# Patient Record
Sex: MALE
[De-identification: names, ages, dates, MRNs, and addresses within clinical notes are randomized per-mention and may not be internally consistent; named-entity substitution may affect disease eponyms.]

---

## 2021-09-28 PROBLEM — Z00.00 ENCOUNTER FOR PREVENTIVE HEALTH EXAMINATION: Status: ACTIVE | Noted: 2021-09-28

## 2021-09-29 ENCOUNTER — APPOINTMENT (OUTPATIENT)
Dept: PULMONOLOGY | Facility: CLINIC | Age: 81
End: 2021-09-29
Payer: MEDICARE

## 2021-09-29 VITALS — WEIGHT: 312 LBS | HEIGHT: 75 IN | HEART RATE: 64 BPM | BODY MASS INDEX: 38.79 KG/M2 | OXYGEN SATURATION: 90 %

## 2021-09-29 DIAGNOSIS — J44.9 CHRONIC OBSTRUCTIVE PULMONARY DISEASE, UNSPECIFIED: ICD-10-CM

## 2021-09-29 DIAGNOSIS — J96.11 CHRONIC RESPIRATORY FAILURE WITH HYPOXIA: ICD-10-CM

## 2021-09-29 DIAGNOSIS — Z86.19 PERSONAL HISTORY OF OTHER INFECTIOUS AND PARASITIC DISEASES: ICD-10-CM

## 2021-09-29 DIAGNOSIS — F17.200 NICOTINE DEPENDENCE, UNSPECIFIED, UNCOMPLICATED: ICD-10-CM

## 2021-09-29 DIAGNOSIS — R06.00 DYSPNEA, UNSPECIFIED: ICD-10-CM

## 2021-09-29 DIAGNOSIS — E66.9 OBESITY, UNSPECIFIED: ICD-10-CM

## 2021-09-29 DIAGNOSIS — Z87.39 PERSONAL HISTORY OF OTHER DISEASES OF THE MUSCULOSKELETAL SYSTEM AND CONNECTIVE TISSUE: ICD-10-CM

## 2021-09-29 DIAGNOSIS — I27.81 COR PULMONALE (CHRONIC): ICD-10-CM

## 2021-09-29 DIAGNOSIS — Z86.39 PERSONAL HISTORY OF OTHER ENDOCRINE, NUTRITIONAL AND METABOLIC DISEASE: ICD-10-CM

## 2021-09-29 DIAGNOSIS — Z86.79 PERSONAL HISTORY OF OTHER DISEASES OF THE CIRCULATORY SYSTEM: ICD-10-CM

## 2021-09-29 DIAGNOSIS — Z86.16 PERSONAL HISTORY OF COVID-19: ICD-10-CM

## 2021-09-29 PROCEDURE — 99204 OFFICE O/P NEW MOD 45 MIN: CPT

## 2021-09-29 RX ORDER — FLUTICASONE FUROATE, UMECLIDINIUM BROMIDE AND VILANTEROL TRIFENATATE 100; 62.5; 25 UG/1; UG/1; UG/1
100-62.5-25 POWDER RESPIRATORY (INHALATION) DAILY
Qty: 1 | Refills: 3 | Status: ACTIVE | COMMUNITY
Start: 2021-09-29 | End: 1900-01-01

## 2021-09-29 NOTE — CONSULT LETTER
[Dear  ___] : Dear  [unfilled], [Consult Letter:] : I had the pleasure of evaluating your patient, [unfilled]. [Please see my note below.] : Please see my note below. [Consult Closing:] : Thank you very much for allowing me to participate in the care of this patient.  If you have any questions, please do not hesitate to contact me. [Sincerely,] : Sincerely, [DrPartha  ___] : Dr. FERNÁNDEZ

## 2021-09-29 NOTE — HISTORY OF PRESENT ILLNESS
[TextBox_4] : 100 pack year smoker \par [facility]\par Recently using Trelegy and 02 at 1-4 LPM depending on level of exertion\par Changed Doctors since Can't be seen on Mondays or Fridays - in [place] - working - [employer]\par 02 dependent since had COVID in April of 2020\par Has had recent PFT and CXR via [facility] - will obtain records \par Says Dr Shearer prescribed Furosemide and Dr Deleon follows labs

## 2021-09-29 NOTE — PHYSICAL EXAM
[No Acute Distress] : no acute distress [Elongated Uvula] : elongated uvula [Enlarged Base of the Tongue] : enlarged base of the tongue [II] : Mallampati Class: II [Normal Appearance] : normal appearance [Neck Circumference: ___] : neck circumference: [unfilled] [No Neck Mass] : no neck mass [Normal Rate/Rhythm] : normal rate/rhythm [No Resp Distress] : no resp distress [Clear to Auscultation Bilaterally] : clear to auscultation bilaterally [No Clubbing] : no clubbing [No Cyanosis] : no cyanosis [No Edema] : no edema [No Focal Deficits] : no focal deficits [Oriented x3] : oriented x3 [Normal Affect] : normal affect [TextBox_2] : obese [TextBox_68] : Diminished

## 2021-09-30 ENCOUNTER — RX CHANGE (OUTPATIENT)
Age: 81
End: 2021-09-30

## 2021-09-30 RX ORDER — FUROSEMIDE 40 MG/1
40 TABLET ORAL
Qty: 90 | Refills: 1 | Status: ACTIVE | COMMUNITY
Start: 2021-09-29 | End: 1900-01-01

## 2022-03-06 ENCOUNTER — INPATIENT (INPATIENT)
Facility: HOSPITAL | Age: 82
LOS: 12 days | Discharge: HOSPICE | DRG: 291 | End: 2022-03-19
Attending: INTERNAL MEDICINE | Admitting: FAMILY MEDICINE
Payer: MEDICARE

## 2022-03-06 VITALS
HEART RATE: 52 BPM | TEMPERATURE: 98 F | DIASTOLIC BLOOD PRESSURE: 74 MMHG | OXYGEN SATURATION: 88 % | SYSTOLIC BLOOD PRESSURE: 147 MMHG | RESPIRATION RATE: 24 BRPM

## 2022-03-06 DIAGNOSIS — Z98.890 OTHER SPECIFIED POSTPROCEDURAL STATES: Chronic | ICD-10-CM

## 2022-03-06 DIAGNOSIS — I50.23 ACUTE ON CHRONIC SYSTOLIC (CONGESTIVE) HEART FAILURE: Chronic | ICD-10-CM

## 2022-03-06 DIAGNOSIS — I50.9 HEART FAILURE, UNSPECIFIED: ICD-10-CM

## 2022-03-06 LAB
ALBUMIN SERPL ELPH-MCNC: 3.5 G/DL — SIGNIFICANT CHANGE UP (ref 3.3–5.2)
ALP SERPL-CCNC: 80 U/L — SIGNIFICANT CHANGE UP (ref 40–120)
ALT FLD-CCNC: 11 U/L — SIGNIFICANT CHANGE UP
ANION GAP SERPL CALC-SCNC: 8 MMOL/L — SIGNIFICANT CHANGE UP (ref 5–17)
APTT BLD: 32.1 SEC — SIGNIFICANT CHANGE UP (ref 27.5–35.5)
AST SERPL-CCNC: 15 U/L — SIGNIFICANT CHANGE UP
BASO STIPL BLD QL SMEAR: PRESENT — SIGNIFICANT CHANGE UP
BASOPHILS # BLD AUTO: 0.12 K/UL — SIGNIFICANT CHANGE UP (ref 0–0.2)
BASOPHILS NFR BLD AUTO: 2.5 % — HIGH (ref 0–2)
BILIRUB SERPL-MCNC: 1.1 MG/DL — SIGNIFICANT CHANGE UP (ref 0.4–2)
BUN SERPL-MCNC: 34.4 MG/DL — HIGH (ref 8–20)
CALCIUM SERPL-MCNC: 8.8 MG/DL — SIGNIFICANT CHANGE UP (ref 8.6–10.2)
CHLORIDE SERPL-SCNC: 99 MMOL/L — SIGNIFICANT CHANGE UP (ref 98–107)
CK SERPL-CCNC: 37 U/L — SIGNIFICANT CHANGE UP (ref 30–200)
CO2 SERPL-SCNC: 34 MMOL/L — HIGH (ref 22–29)
CREAT SERPL-MCNC: 1.57 MG/DL — HIGH (ref 0.5–1.3)
EGFR: 45 ML/MIN/1.73M2 — LOW
EOSINOPHIL # BLD AUTO: 0.16 K/UL — SIGNIFICANT CHANGE UP (ref 0–0.5)
EOSINOPHIL NFR BLD AUTO: 3.4 % — SIGNIFICANT CHANGE UP (ref 0–6)
FLUAV AG NPH QL: SIGNIFICANT CHANGE UP
FLUBV AG NPH QL: SIGNIFICANT CHANGE UP
GIANT PLATELETS BLD QL SMEAR: PRESENT — SIGNIFICANT CHANGE UP
GLUCOSE BLDC GLUCOMTR-MCNC: 143 MG/DL — HIGH (ref 70–99)
GLUCOSE SERPL-MCNC: 113 MG/DL — HIGH (ref 70–99)
HCT VFR BLD CALC: 38.1 % — LOW (ref 39–50)
HGB BLD-MCNC: 11.2 G/DL — LOW (ref 13–17)
INR BLD: 1.2 RATIO — HIGH (ref 0.88–1.16)
LACTATE BLDV-MCNC: 1.6 MMOL/L — SIGNIFICANT CHANGE UP (ref 0.5–2)
LYMPHOCYTES # BLD AUTO: 0.4 K/UL — LOW (ref 1–3.3)
LYMPHOCYTES # BLD AUTO: 8.5 % — LOW (ref 13–44)
MACROCYTES BLD QL: SLIGHT — SIGNIFICANT CHANGE UP
MANUAL SMEAR VERIFICATION: SIGNIFICANT CHANGE UP
MCHC RBC-ENTMCNC: 29.4 GM/DL — LOW (ref 32–36)
MCHC RBC-ENTMCNC: 30.2 PG — SIGNIFICANT CHANGE UP (ref 27–34)
MCV RBC AUTO: 102.7 FL — HIGH (ref 80–100)
MONOCYTES # BLD AUTO: 0.6 K/UL — SIGNIFICANT CHANGE UP (ref 0–0.9)
MONOCYTES NFR BLD AUTO: 12.7 % — SIGNIFICANT CHANGE UP (ref 2–14)
NEUTROPHILS # BLD AUTO: 3.38 K/UL — SIGNIFICANT CHANGE UP (ref 1.8–7.4)
NEUTROPHILS NFR BLD AUTO: 72 % — SIGNIFICANT CHANGE UP (ref 43–77)
PLAT MORPH BLD: NORMAL — SIGNIFICANT CHANGE UP
PLATELET # BLD AUTO: 188 K/UL — SIGNIFICANT CHANGE UP (ref 150–400)
POIKILOCYTOSIS BLD QL AUTO: SLIGHT — SIGNIFICANT CHANGE UP
POLYCHROMASIA BLD QL SMEAR: SLIGHT — SIGNIFICANT CHANGE UP
POTASSIUM SERPL-MCNC: 4.8 MMOL/L — SIGNIFICANT CHANGE UP (ref 3.5–5.3)
POTASSIUM SERPL-SCNC: 4.8 MMOL/L — SIGNIFICANT CHANGE UP (ref 3.5–5.3)
PROT SERPL-MCNC: 6.7 G/DL — SIGNIFICANT CHANGE UP (ref 6.6–8.7)
PROTHROM AB SERPL-ACNC: 13.9 SEC — HIGH (ref 10.5–13.4)
RBC # BLD: 3.71 M/UL — LOW (ref 4.2–5.8)
RBC # FLD: 17.2 % — HIGH (ref 10.3–14.5)
RBC BLD AUTO: NORMAL — SIGNIFICANT CHANGE UP
RSV RNA NPH QL NAA+NON-PROBE: SIGNIFICANT CHANGE UP
SARS-COV-2 RNA SPEC QL NAA+PROBE: SIGNIFICANT CHANGE UP
SODIUM SERPL-SCNC: 141 MMOL/L — SIGNIFICANT CHANGE UP (ref 135–145)
TROPONIN T SERPL-MCNC: 0.02 NG/ML — SIGNIFICANT CHANGE UP (ref 0–0.06)
TROPONIN T SERPL-MCNC: 0.03 NG/ML — SIGNIFICANT CHANGE UP (ref 0–0.06)
VARIANT LYMPHS # BLD: 0.9 % — SIGNIFICANT CHANGE UP (ref 0–6)
WBC # BLD: 4.69 K/UL — SIGNIFICANT CHANGE UP (ref 3.8–10.5)
WBC # FLD AUTO: 4.69 K/UL — SIGNIFICANT CHANGE UP (ref 3.8–10.5)

## 2022-03-06 PROCEDURE — 99284 EMERGENCY DEPT VISIT MOD MDM: CPT

## 2022-03-06 PROCEDURE — 76870 US EXAM SCROTUM: CPT | Mod: 26

## 2022-03-06 PROCEDURE — 99497 ADVNCD CARE PLAN 30 MIN: CPT | Mod: 25

## 2022-03-06 PROCEDURE — 93306 TTE W/DOPPLER COMPLETE: CPT | Mod: 26

## 2022-03-06 PROCEDURE — 74177 CT ABD & PELVIS W/CONTRAST: CPT | Mod: 26,ME

## 2022-03-06 PROCEDURE — 99223 1ST HOSP IP/OBS HIGH 75: CPT

## 2022-03-06 PROCEDURE — 71045 X-RAY EXAM CHEST 1 VIEW: CPT | Mod: 26

## 2022-03-06 PROCEDURE — G1004: CPT

## 2022-03-06 RX ORDER — IPRATROPIUM/ALBUTEROL SULFATE 18-103MCG
3 AEROSOL WITH ADAPTER (GRAM) INHALATION EVERY 6 HOURS
Refills: 0 | Status: DISCONTINUED | OUTPATIENT
Start: 2022-03-06 | End: 2022-03-13

## 2022-03-06 RX ORDER — ONDANSETRON 8 MG/1
4 TABLET, FILM COATED ORAL EVERY 6 HOURS
Refills: 0 | Status: DISCONTINUED | OUTPATIENT
Start: 2022-03-06 | End: 2022-03-13

## 2022-03-06 RX ORDER — CHOLECALCIFEROL (VITAMIN D3) 125 MCG
1000 CAPSULE ORAL DAILY
Refills: 0 | Status: DISCONTINUED | OUTPATIENT
Start: 2022-03-06 | End: 2022-03-13

## 2022-03-06 RX ORDER — FOLIC ACID 0.8 MG
1 TABLET ORAL DAILY
Refills: 0 | Status: DISCONTINUED | OUTPATIENT
Start: 2022-03-06 | End: 2022-03-13

## 2022-03-06 RX ORDER — DEXTROSE 50 % IN WATER 50 %
25 SYRINGE (ML) INTRAVENOUS ONCE
Refills: 0 | Status: DISCONTINUED | OUTPATIENT
Start: 2022-03-06 | End: 2022-03-13

## 2022-03-06 RX ORDER — ACETAMINOPHEN 500 MG
650 TABLET ORAL EVERY 6 HOURS
Refills: 0 | Status: DISCONTINUED | OUTPATIENT
Start: 2022-03-06 | End: 2022-03-13

## 2022-03-06 RX ORDER — FUROSEMIDE 40 MG
40 TABLET ORAL EVERY 12 HOURS
Refills: 0 | Status: DISCONTINUED | OUTPATIENT
Start: 2022-03-06 | End: 2022-03-10

## 2022-03-06 RX ORDER — DEXTROSE 50 % IN WATER 50 %
15 SYRINGE (ML) INTRAVENOUS ONCE
Refills: 0 | Status: DISCONTINUED | OUTPATIENT
Start: 2022-03-06 | End: 2022-03-13

## 2022-03-06 RX ORDER — TAMSULOSIN HYDROCHLORIDE 0.4 MG/1
0.4 CAPSULE ORAL AT BEDTIME
Refills: 0 | Status: DISCONTINUED | OUTPATIENT
Start: 2022-03-06 | End: 2022-03-13

## 2022-03-06 RX ORDER — PREGABALIN 225 MG/1
1000 CAPSULE ORAL DAILY
Refills: 0 | Status: DISCONTINUED | OUTPATIENT
Start: 2022-03-06 | End: 2022-03-13

## 2022-03-06 RX ORDER — AMLODIPINE BESYLATE 2.5 MG/1
10 TABLET ORAL DAILY
Refills: 0 | Status: DISCONTINUED | OUTPATIENT
Start: 2022-03-06 | End: 2022-03-07

## 2022-03-06 RX ORDER — SODIUM CHLORIDE 9 MG/ML
1000 INJECTION, SOLUTION INTRAVENOUS
Refills: 0 | Status: DISCONTINUED | OUTPATIENT
Start: 2022-03-06 | End: 2022-03-13

## 2022-03-06 RX ORDER — TIOTROPIUM BROMIDE 18 UG/1
1 CAPSULE ORAL; RESPIRATORY (INHALATION) DAILY
Refills: 0 | Status: DISCONTINUED | OUTPATIENT
Start: 2022-03-06 | End: 2022-03-08

## 2022-03-06 RX ORDER — HEPARIN SODIUM 5000 [USP'U]/ML
5000 INJECTION INTRAVENOUS; SUBCUTANEOUS EVERY 8 HOURS
Refills: 0 | Status: DISCONTINUED | OUTPATIENT
Start: 2022-03-06 | End: 2022-03-09

## 2022-03-06 RX ORDER — OXYCODONE HYDROCHLORIDE 5 MG/1
5 TABLET ORAL EVERY 6 HOURS
Refills: 0 | Status: DISCONTINUED | OUTPATIENT
Start: 2022-03-06 | End: 2022-03-13

## 2022-03-06 RX ORDER — DEXTROSE 50 % IN WATER 50 %
12.5 SYRINGE (ML) INTRAVENOUS ONCE
Refills: 0 | Status: DISCONTINUED | OUTPATIENT
Start: 2022-03-06 | End: 2022-03-13

## 2022-03-06 RX ORDER — POLYETHYLENE GLYCOL 3350 17 G/17G
17 POWDER, FOR SOLUTION ORAL DAILY
Refills: 0 | Status: DISCONTINUED | OUTPATIENT
Start: 2022-03-06 | End: 2022-03-11

## 2022-03-06 RX ORDER — LEVOTHYROXINE SODIUM 125 MCG
300 TABLET ORAL DAILY
Refills: 0 | Status: DISCONTINUED | OUTPATIENT
Start: 2022-03-06 | End: 2022-03-13

## 2022-03-06 RX ORDER — METOPROLOL TARTRATE 50 MG
100 TABLET ORAL DAILY
Refills: 0 | Status: DISCONTINUED | OUTPATIENT
Start: 2022-03-06 | End: 2022-03-10

## 2022-03-06 RX ORDER — SENNA PLUS 8.6 MG/1
2 TABLET ORAL AT BEDTIME
Refills: 0 | Status: DISCONTINUED | OUTPATIENT
Start: 2022-03-06 | End: 2022-03-13

## 2022-03-06 RX ORDER — FUROSEMIDE 40 MG
40 TABLET ORAL ONCE
Refills: 0 | Status: COMPLETED | OUTPATIENT
Start: 2022-03-06 | End: 2022-03-06

## 2022-03-06 RX ORDER — GLUCAGON INJECTION, SOLUTION 0.5 MG/.1ML
1 INJECTION, SOLUTION SUBCUTANEOUS ONCE
Refills: 0 | Status: DISCONTINUED | OUTPATIENT
Start: 2022-03-06 | End: 2022-03-13

## 2022-03-06 RX ORDER — INSULIN LISPRO 100/ML
VIAL (ML) SUBCUTANEOUS AT BEDTIME
Refills: 0 | Status: DISCONTINUED | OUTPATIENT
Start: 2022-03-06 | End: 2022-03-13

## 2022-03-06 RX ORDER — BUDESONIDE AND FORMOTEROL FUMARATE DIHYDRATE 160; 4.5 UG/1; UG/1
2 AEROSOL RESPIRATORY (INHALATION)
Refills: 0 | Status: DISCONTINUED | OUTPATIENT
Start: 2022-03-06 | End: 2022-03-08

## 2022-03-06 RX ORDER — INSULIN LISPRO 100/ML
VIAL (ML) SUBCUTANEOUS
Refills: 0 | Status: DISCONTINUED | OUTPATIENT
Start: 2022-03-06 | End: 2022-03-13

## 2022-03-06 RX ADMIN — TAMSULOSIN HYDROCHLORIDE 0.4 MILLIGRAM(S): 0.4 CAPSULE ORAL at 22:44

## 2022-03-06 RX ADMIN — Medication 40 MILLIGRAM(S): at 22:43

## 2022-03-06 RX ADMIN — HEPARIN SODIUM 5000 UNIT(S): 5000 INJECTION INTRAVENOUS; SUBCUTANEOUS at 22:43

## 2022-03-06 RX ADMIN — BUDESONIDE AND FORMOTEROL FUMARATE DIHYDRATE 2 PUFF(S): 160; 4.5 AEROSOL RESPIRATORY (INHALATION) at 21:06

## 2022-03-06 RX ADMIN — Medication 40 MILLIGRAM(S): at 12:11

## 2022-03-06 NOTE — H&P ADULT - ASSESSMENT
INCOMPLETE 81 year old male with history of Lymphedema, Morbid Obesity, COPD on 4LNC, Smoking (stopped 3 years ago), Lung Nodules, HTN, Hypothyroidism and COVID-19 brought by daughter with hypoxia and scrotal swelling. As per patient, he has noticed scrotal swelling for 10 days and it is getting worse along with lymphedema. He is also complaining of exertional dyspnea. Denies orthopnea or paroxysmal nocturnal dyspnea. Denies fever, sick contacts or recent travel.   Patient denies history of CHF. Has not seen Cardiologist in 3 years. He takes Lasix for lymphedema however he is not compliant with it.   In ER, he was found to be hypoxic to 88%, supplemental oxygen was increased to 5LNC. He was noted to have anasarca and was given a dose of Lasix 40 mg x 1.     1) Anasarca   - Suspected underlying heart failure  - Strict intake/output and daily weight  - ECHO  - Lasix 40 mg IVP q 12 hours  - [facility] was consulted by ED    2) Right Pleural Effusion   - Lasix as above  - Consider thoracocentesis if no improvement     3) Scrotal Swelling  - Likely due to above  - Scrotal US  - Will consider Urology Consult if no improvement     4) Left Renal Lesion (suspected RCC)   - Discussed with patient, he denies any history in past   - Needs further work up by Urology     5) Renal Insufficiency  - Unknowns baseline renal function   - Avoid nephrotoxic medications  - Monitor renal function     6) BPH  - Shukla's catheter was placed in ER  - TOV in 3-4 days  - Start Flomax     7) COPD  - Symbicort and Spiriva (takes Trelegy at home)  - DuoNeb PRN    8) HTN  - Continue Metoprolol and Amlodipine    9) DM 2  - HbA1c   - Accu checks and ISS    10) Hypothyroidism  - Continue Synthroid     11) Elevated Lactate  - Likely due to hypoxia  - Repeat     DVT Prophylaxis -- Heparin SQ    Advance Directives: DNR/I.     Dispo: Likely home once stable.  81 year old male with history of Lymphedema, Morbid Obesity, COPD on 4LNC, Smoking (stopped 3 years ago), Lung Nodules, HTN, Hypothyroidism, RA and COVID-19 brought by daughter with hypoxia and scrotal swelling. As per patient, he has noticed scrotal swelling for 10 days and it is getting worse along with lymphedema. He is also complaining of exertional dyspnea. Denies orthopnea or paroxysmal nocturnal dyspnea. Denies fever, sick contacts or recent travel.   Patient denies history of CHF. Has not seen Cardiologist in 3 years. He takes Lasix for lymphedema however he is not compliant with it.   In ER, he was found to be hypoxic to 88%, supplemental oxygen was increased to 5LNC. He was noted to have anasarca and was given a dose of Lasix 40 mg x 1.     1) Anasarca   - Suspected underlying heart failure  - Strict intake/output and daily weight  - ECHO  - Lasix 40 mg IVP q 12 hours  - [facility] was consulted by ED    2) Right Pleural Effusion   - Lasix as above  - Consider thoracocentesis if no improvement     3) Scrotal Swelling  - Likely due to above  - Scrotal US  - Will consider Urology Consult if no improvement     4) Left Renal Lesion (suspected RCC)   - Discussed with patient, he denies any history in past   - Needs further work up by Urology     5) Renal Insufficiency  - Unknowns baseline renal function   - Avoid nephrotoxic medications  - Monitor renal function     6) BPH  - Shukla's catheter was placed in ER  - TOV in 3-4 days  - Start Flomax     7) COPD  - Symbicort and Spiriva (takes Trelegy at home)  - DuoNeb PRN    8) HTN  - Continue Metoprolol and Amlodipine    9) DM 2  - HbA1c   - Accu checks and ISS    10) Hypothyroidism  - Continue Synthroid     11) Rheumatoid Arthritis  - Gets weekly Methotrexate (on Friday)  - Will avoid NSAIDs due to renal insufficiency  - Oxycodone PRN    12) Elevated Lactate  - Likely due to hypoxia  - Repeat     DVT Prophylaxis -- Heparin SQ    Advance Directives: DNR/I.     Dispo: Likely home once stable.

## 2022-03-06 NOTE — ED PROVIDER NOTE - OBJECTIVE STATEMENT
Patient is a 80yo M presenting with chief complaint of his balls being too large. He states that he is on 5L home O2 continuously. He states that he has intermittently been taking his water pill. Patient states that he follows with a pulmonologist. Patient notes a history of COPD and COVID in 2020, has been on O2 since. Denies cardiac disease, Denies fevers, chills, chest pain, nausea, vomiting, diarrhea Patient is a 80yo M presenting with chief complaint of his balls being too large. He states that he is on 5L home O2 continuously. He states that he has intermittently been taking his water pill. Patient states that he follows with a pulmonologist. Patient notes a history of COPD, chf, and COVID in 2020, has been on O2 since. Denies cardiac disease, Denies fevers, chills, chest pain, nausea, vomiting, diarrhea Patient is a 82yo M presenting with chief complaint of his balls being too large. He states that he is on 4L home O2 continuously. He states that he has intermittently been taking his water pill. Patient states that he follows with a pulmonologist. Patient notes a history of COPD, chf, and COVID in 2020, has been on O2 since. Denies cardiac disease, Denies fevers, chills, chest pain, nausea, vomiting, diarrhea    pulm: dontae

## 2022-03-06 NOTE — ED PROVIDER NOTE - NS ED ROS FT
General: Denies fever, chills  MSK: Denies back pain, joint pain  Resp: + SOB  CV: Denies CP, palpitations  GI: Denies nausea, vomiting  Neuro: Denies numbness, tingling  : + scrotal swelling

## 2022-03-06 NOTE — ED PROVIDER NOTE - PHYSICAL EXAMINATION
General: Obese male in no acute distress  HEENT: Normocephalic, atraumatic. Moist mucous membranes.  Eyes: No scleral icterus. No conjunctival pallor. EOMI. ANTONIO.  Neck: Soft and supple. Full ROM without pain. No midline tenderness  Cardiac: Regular rate and regular rhythm. No murmurs.  Resp: Lungs shallow breath sounds. No wheezes, rales or rhonchi.  Abd: Soft, non-tender, non-distended. No guarding or rebound.   : Scrotal edema, no crepitus or hernia palpated  Skin: b/l LE chronic venous stasis  Neuro: AO x 3. Motor strength and sensation grossly intact.

## 2022-03-06 NOTE — H&P ADULT - NSHPPHYSICALEXAM_GEN_ALL_CORE
Vital Signs   T(C): 36.3 (06 Mar 2022 16:45), Max: 36.7 (06 Mar 2022 11:39)  T(F): 97.4 (06 Mar 2022 16:45), Max: 98 (06 Mar 2022 11:39)  HR: 50 (06 Mar 2022 16:45) (50 - 52)  BP: 132/64 (06 Mar 2022 16:45) (132/64 - 147/74)  RR: 22 (06 Mar 2022 16:45) (22 - 24)  SpO2: 96% (06 Mar 2022 16:45) (88% - 96%)  General: Elderly male sitting in bed comfortably. No acute distress  HEENT: EOMI. Clear conjunctivae. Moist mucus membrane  Neck: Supple.   Chest: Decreased air entry on right side. Fine rales bilaterally. No wheezing or rhonchi. No chest wall tenderness.  Heart: Normal S1 & S2 with RRR.   Abdomen: Obese. Soft. Non-tender. + BS  : Shukla's catheter in place. Scrotum swollen - no signs of infection. Fungal rash in groins.   Ext: Lymphedema. No calf tenderness. Chronic skin changes.    Neuro: Active and alert. No focal deficit. No speech disorder.  Skin: Warm and Dry  Psychiatry: Normal mood and affect

## 2022-03-06 NOTE — ED ADULT TRIAGE NOTE - CHIEF COMPLAINT QUOTE
dgtr states fathers oxygen saturation is in 60's, states he has a hernia, his balls are the size of grapefruits",   took a lot to get him here, dad stated he wants to die in the chair  pt assisted by 6 staff members out of  truck

## 2022-03-06 NOTE — H&P ADULT - HISTORY OF PRESENT ILLNESS
INCOMPLETE 81 year old male with history of Lymphedema, Morbid Obesity, COPD on 4LNC, Smoking (stopped 3 years ago), Lung Nodules, HTN, Hypothyroidism and COVID-19 brought by daughter with hypoxia and scrotal swelling. As per patient, he has noticed scrotal swelling for 10 days and it is getting worse along with lymphedema. He is also complaining of exertional dyspnea. Denies orthopnea or paroxysmal nocturnal dyspnea. Denies fever, sick contacts or recent travel.   Patient denies history of CHF. Has not seen Cardiologist in 3 years. He takes Lasix for lymphedema however he is not compliant with it.   In ER, he was found to be hypoxic to 88%, supplemental oxygen was increased to 5LNC. He was noted to have anasarca and was given a dose of Lasix 40 mg x 1.  81 year old male with history of Lymphedema, Morbid Obesity, COPD on 4LNC, Smoking (stopped 3 years ago), Lung Nodules, HTN, Hypothyroidism, RA and COVID-19 brought by daughter with hypoxia and scrotal swelling. As per patient, he has noticed scrotal swelling for 10 days and it is getting worse along with lymphedema. He is also complaining of exertional dyspnea. Denies orthopnea or paroxysmal nocturnal dyspnea. Denies fever, sick contacts or recent travel.   Patient denies history of CHF. Has not seen Cardiologist in 3 years. He takes Lasix for lymphedema however he is not compliant with it.   In ER, he was found to be hypoxic to 88%, supplemental oxygen was increased to 5LNC. He was noted to have anasarca and was given a dose of Lasix 40 mg x 1.

## 2022-03-06 NOTE — H&P ADULT - NSICDXFAMILYHX_GEN_ALL_CORE_FT
FAMILY HISTORY:  Father  Still living? Unknown  Family history of heart disease, Age at diagnosis: Age Unknown    Grandparent  Still living? No  Family history of heart disease, Age at diagnosis: Age Unknown

## 2022-03-06 NOTE — ED PROVIDER NOTE - ATTENDING CONTRIBUTION TO CARE
81yoM; with PMH signif for COPD(on home O2 4L), CHF HTN, HLD, CAD; now presenting c/o scrotal edema. patient reports increasing meza/sob over past week. states progressively worsening LE edema, progressing proximally over past 3-4 days. denies f/c/s. denies any worsening cough. denies trauma, surgery, or sedentary state. states he has been intermittently non-compliant with his diuretic because he doesn't like urinating frequently.  denies chest pain or palpitations.  General:  mild resp distress  Head:     NC/AT, EOMI, oral mucosa moist  Neck:     trachea midline  Lungs:    bibasilar crackles, tachypnea, no retractions.   CVS:     S1S2, RRR, no m/g/r  Abd:     +BS, s/nt/nd, no organomegaly, anasarca over waist  Ext:    2+ radial and pedal pulses, 2+ pedal edema to waist with scrotal edema  :  no crepitus over scrotum, but marked edema.   Neuro: AAOx3, no sensory/motor deficits  A/P:  81yoM p/w sob w/anasarca  -labs, oxgyen supplementation, xray, diuresis, ct to eval scrotum, admit for diuresis and monitoring.

## 2022-03-06 NOTE — ED PROVIDER NOTE - PROGRESS NOTE DETAILS
Reassessed, remains stable on 6L O2. Explained results of CT and imaging and need for continued diuresis.

## 2022-03-06 NOTE — H&P ADULT - CONVERSATION DETAILS
Discussed goals of care with patient. He is very clear about advance directives. Does not want any heroic measures. Wants to be DNR/I. As per family, he has discussed with his family and they are aware.

## 2022-03-06 NOTE — ED PROVIDER NOTE - CLINICAL SUMMARY MEDICAL DECISION MAKING FREE TEXT BOX
Patient noncompliant with home lasix presenting with increased O2 requirement and scrotal edema. Home O2 4L, now requiring 6L NC. Patient to be worked up with labwork and CT a/p for scrotal swelling evaluation. CXR showing fluid overload, b/l pleural effusions, judicious fluid administration despite elevated lactate.

## 2022-03-06 NOTE — H&P ADULT - NSICDXPASTMEDICALHX_GEN_ALL_CORE_FT
PAST MEDICAL HISTORY:  2019 novel coronavirus disease (COVID-19)     Chronic obstructive pulmonary disease (COPD)     DM (diabetes mellitus), type 2     HLD (hyperlipidemia)     HTN (hypertension)     Lung nodules

## 2022-03-06 NOTE — ED ADULT NURSE NOTE - OBJECTIVE STATEMENT
pt comes in complaining of testicular swelling x 1 week. Patient states the swelling has been increasing daily but denies any pain, trauma, fever or discomfort. Patient denies any other medical symptoms at this time. Patient also noted to be satting down to 60's in triage. Pt is a home o2 user, stating he uses 4L at rest, and 5L with activity. Pt's testicals are enlarged and red, Pt has generalized edema throughout whole body, +3 edema on bilateral legs. ankles, and feet. Pt states his edema started x 2.5 months. Pt currently satting at 96% on 5L nasal cannula.

## 2022-03-06 NOTE — H&P ADULT - NSHPLABSRESULTS_GEN_ALL_CORE
LABS:                        11.2   4.69  )-----------( 188      ( 06 Mar 2022 11:52 )             38.1     03-06    141  |  99  |  34.4<H>  ----------------------------<  113<H>  4.8   |  34.0<H>  |  1.57<H>    Ca    8.8      06 Mar 2022 11:52    TPro  6.7  /  Alb  3.5  /  TBili  1.1  /  DBili  x   /  AST  15  /  ALT  11  /  AlkPhos  80  03-06    PT/INR - ( 06 Mar 2022 12:32 )   PT: 13.9 sec;   INR: 1.20 ratio       PTT - ( 06 Mar 2022 12:32 )  PTT:32.1 sec  CARDIAC MARKERS ( 06 Mar 2022 11:52 )  x     / 0.03 ng/mL / x     / x     / x          CT Abdomen and Pelvis w/ IV Cont (03.06.22 @ 14:43)     Anasarca. Marked enlargement and swelling of the scrotum. No soft tissue gas.    4.6 cm left upper pole renal lesion suspicious for renal cell carcinoma.   Recommended dedicated pre and postcontrast MRI or CT scan of the abdomen.    Small amount of ascites. Moderate size right pleural effusion.

## 2022-03-06 NOTE — H&P ADULT - NSHPSOCIALHISTORY_GEN_ALL_CORE
Lives with daughter.  Former smoker - quit 3 years ago.  Stopped alcohol 15 years ago.  No illicit drug use.

## 2022-03-07 LAB
A1C WITH ESTIMATED AVERAGE GLUCOSE RESULT: 6.9 % — HIGH (ref 4–5.6)
ANION GAP SERPL CALC-SCNC: 7 MMOL/L — SIGNIFICANT CHANGE UP (ref 5–17)
BUN SERPL-MCNC: 37.3 MG/DL — HIGH (ref 8–20)
CALCIUM SERPL-MCNC: 8.8 MG/DL — SIGNIFICANT CHANGE UP (ref 8.6–10.2)
CHLORIDE SERPL-SCNC: 101 MMOL/L — SIGNIFICANT CHANGE UP (ref 98–107)
CK SERPL-CCNC: 32 U/L — SIGNIFICANT CHANGE UP (ref 30–200)
CO2 SERPL-SCNC: 35 MMOL/L — HIGH (ref 22–29)
CREAT SERPL-MCNC: 1.52 MG/DL — HIGH (ref 0.5–1.3)
EGFR: 46 ML/MIN/1.73M2 — LOW
ESTIMATED AVERAGE GLUCOSE: 151 MG/DL — HIGH (ref 68–114)
GLUCOSE BLDC GLUCOMTR-MCNC: 115 MG/DL — HIGH (ref 70–99)
GLUCOSE BLDC GLUCOMTR-MCNC: 129 MG/DL — HIGH (ref 70–99)
GLUCOSE BLDC GLUCOMTR-MCNC: 135 MG/DL — HIGH (ref 70–99)
GLUCOSE BLDC GLUCOMTR-MCNC: 145 MG/DL — HIGH (ref 70–99)
GLUCOSE SERPL-MCNC: 128 MG/DL — HIGH (ref 70–99)
HCT VFR BLD CALC: 36.4 % — LOW (ref 39–50)
HGB BLD-MCNC: 10.6 G/DL — LOW (ref 13–17)
MAGNESIUM SERPL-MCNC: 1.6 MG/DL — SIGNIFICANT CHANGE UP (ref 1.6–2.6)
MCHC RBC-ENTMCNC: 29.1 GM/DL — LOW (ref 32–36)
MCHC RBC-ENTMCNC: 29.4 PG — SIGNIFICANT CHANGE UP (ref 27–34)
MCV RBC AUTO: 100.8 FL — HIGH (ref 80–100)
PHOSPHATE SERPL-MCNC: 3.3 MG/DL — SIGNIFICANT CHANGE UP (ref 2.4–4.7)
PLATELET # BLD AUTO: 169 K/UL — SIGNIFICANT CHANGE UP (ref 150–400)
POTASSIUM SERPL-MCNC: 4.6 MMOL/L — SIGNIFICANT CHANGE UP (ref 3.5–5.3)
POTASSIUM SERPL-SCNC: 4.6 MMOL/L — SIGNIFICANT CHANGE UP (ref 3.5–5.3)
RBC # BLD: 3.61 M/UL — LOW (ref 4.2–5.8)
RBC # FLD: 16.9 % — HIGH (ref 10.3–14.5)
SODIUM SERPL-SCNC: 143 MMOL/L — SIGNIFICANT CHANGE UP (ref 135–145)
TROPONIN T SERPL-MCNC: 0.02 NG/ML — SIGNIFICANT CHANGE UP (ref 0–0.06)
WBC # BLD: 6.46 K/UL — SIGNIFICANT CHANGE UP (ref 3.8–10.5)
WBC # FLD AUTO: 6.46 K/UL — SIGNIFICANT CHANGE UP (ref 3.8–10.5)

## 2022-03-07 PROCEDURE — 99233 SBSQ HOSP IP/OBS HIGH 50: CPT

## 2022-03-07 RX ORDER — NYSTATIN CREAM 100000 [USP'U]/G
1 CREAM TOPICAL THREE TIMES A DAY
Refills: 0 | Status: DISCONTINUED | OUTPATIENT
Start: 2022-03-07 | End: 2022-03-13

## 2022-03-07 RX ORDER — HYDRALAZINE HCL 50 MG
25 TABLET ORAL THREE TIMES A DAY
Refills: 0 | Status: DISCONTINUED | OUTPATIENT
Start: 2022-03-07 | End: 2022-03-13

## 2022-03-07 RX ORDER — ISOSORBIDE MONONITRATE 60 MG/1
10 TABLET, EXTENDED RELEASE ORAL DAILY
Refills: 0 | Status: DISCONTINUED | OUTPATIENT
Start: 2022-03-07 | End: 2022-03-07

## 2022-03-07 RX ORDER — MAGNESIUM SULFATE 500 MG/ML
2 VIAL (ML) INJECTION ONCE
Refills: 0 | Status: COMPLETED | OUTPATIENT
Start: 2022-03-07 | End: 2022-03-07

## 2022-03-07 RX ADMIN — Medication 1000 UNIT(S): at 12:28

## 2022-03-07 RX ADMIN — Medication 40 MILLIGRAM(S): at 10:17

## 2022-03-07 RX ADMIN — Medication 300 MICROGRAM(S): at 06:17

## 2022-03-07 RX ADMIN — Medication 1 MILLIGRAM(S): at 12:28

## 2022-03-07 RX ADMIN — NYSTATIN CREAM 1 APPLICATION(S): 100000 CREAM TOPICAL at 15:56

## 2022-03-07 RX ADMIN — Medication 25 GRAM(S): at 08:33

## 2022-03-07 RX ADMIN — AMLODIPINE BESYLATE 10 MILLIGRAM(S): 2.5 TABLET ORAL at 06:18

## 2022-03-07 RX ADMIN — TAMSULOSIN HYDROCHLORIDE 0.4 MILLIGRAM(S): 0.4 CAPSULE ORAL at 22:38

## 2022-03-07 RX ADMIN — Medication 40 MILLIGRAM(S): at 22:38

## 2022-03-07 RX ADMIN — HEPARIN SODIUM 5000 UNIT(S): 5000 INJECTION INTRAVENOUS; SUBCUTANEOUS at 22:38

## 2022-03-07 RX ADMIN — HEPARIN SODIUM 5000 UNIT(S): 5000 INJECTION INTRAVENOUS; SUBCUTANEOUS at 13:52

## 2022-03-07 RX ADMIN — Medication 25 MILLIGRAM(S): at 22:38

## 2022-03-07 RX ADMIN — BUDESONIDE AND FORMOTEROL FUMARATE DIHYDRATE 2 PUFF(S): 160; 4.5 AEROSOL RESPIRATORY (INHALATION) at 08:48

## 2022-03-07 RX ADMIN — NYSTATIN CREAM 1 APPLICATION(S): 100000 CREAM TOPICAL at 22:39

## 2022-03-07 RX ADMIN — HEPARIN SODIUM 5000 UNIT(S): 5000 INJECTION INTRAVENOUS; SUBCUTANEOUS at 06:17

## 2022-03-07 RX ADMIN — PREGABALIN 1000 MICROGRAM(S): 225 CAPSULE ORAL at 12:28

## 2022-03-07 RX ADMIN — TIOTROPIUM BROMIDE 1 CAPSULE(S): 18 CAPSULE ORAL; RESPIRATORY (INHALATION) at 08:59

## 2022-03-07 NOTE — CONSULT NOTE ADULT - ASSESSMENT
Patient is a morbidly obese former smoker man with COPD, COVID 19 c/b respiratory failure with prolonged hospitalization, discharge on home O2 and now with chronic respiratory failure requiring 4L O2, hypertension, suspected lymphedema, who has had increasingly sedentary lifestyle since his last hospitalization who presents with dyspnea, increased edema and scrotal edema, found to have acute on chronic hypoxic respiratory failure     acute on chronic hypoxic respiratory failure/hypervolemia/NSVT  - continue lasix 40mg IV   - discontinue amlodipine and add imdur 10mg with hydralazine 25mg TID for further afterload reduction  - continue metoprolol   - strict I/O and daily weight    - Pulmonary consultation, likely to benefit for NIV at night   - TTE  - daily renal indices, Cr noted, avoid nephrotoxins  - eventual LHC/RHC  - may benefit from external compression of the bilateral LE   - tele  - O2 support

## 2022-03-07 NOTE — PROGRESS NOTE ADULT - SUBJECTIVE AND OBJECTIVE BOX
[facility] Division of Hospital Medicine    Chief Complaint:  Scrotal swelling    SUBJECTIVE / OVERNIGHT EVENTS: Patient seen and examined. reports some relied is scrotal swelling. He sees [facility] Dr. Peters. He denies chest pain and shortness of breath. He uses 4L NC 24/7.     Patient denies chest pain, SOB, abd pain, N/V, fever, chills, dysuria or any other complaints. All remainder ROS negative.     MEDICATIONS  (STANDING):  amLODIPine   Tablet 10 milliGRAM(s) Oral daily  budesonide  80 MICROgram(s)/formoterol 4.5 MICROgram(s) Inhaler 2 Puff(s) Inhalation two times a day  cholecalciferol 1000 Unit(s) Oral daily  cyanocobalamin 1000 MICROGram(s) Oral daily  dextrose 40% Gel 15 Gram(s) Oral once  dextrose 5%. 1000 milliLiter(s) (50 mL/Hr) IV Continuous <Continuous>  dextrose 5%. 1000 milliLiter(s) (100 mL/Hr) IV Continuous <Continuous>  dextrose 50% Injectable 25 Gram(s) IV Push once  dextrose 50% Injectable 12.5 Gram(s) IV Push once  dextrose 50% Injectable 25 Gram(s) IV Push once  folic acid 1 milliGRAM(s) Oral daily  furosemide   Injectable 40 milliGRAM(s) IV Push every 12 hours  glucagon  Injectable 1 milliGRAM(s) IntraMuscular once  heparin   Injectable 5000 Unit(s) SubCutaneous every 8 hours  insulin lispro (ADMELOG) corrective regimen sliding scale   SubCutaneous three times a day before meals  insulin lispro (ADMELOG) corrective regimen sliding scale   SubCutaneous at bedtime  levothyroxine 300 MICROGram(s) Oral daily  metoprolol succinate  milliGRAM(s) Oral daily  nystatin Powder 1 Application(s) Topical three times a day  senna 2 Tablet(s) Oral at bedtime  tamsulosin 0.4 milliGRAM(s) Oral at bedtime  tiotropium 18 MICROgram(s) Capsule 1 Capsule(s) Inhalation daily    MEDICATIONS  (PRN):  acetaminophen     Tablet .. 650 milliGRAM(s) Oral every 6 hours PRN Temp greater or equal to 38C (100.4F), Mild Pain (1 - 3), Moderate Pain (4 - 6)  albuterol/ipratropium for Nebulization 3 milliLiter(s) Nebulizer every 6 hours PRN Shortness of Breath and/or Wheezing  ondansetron Injectable 4 milliGRAM(s) IV Push every 6 hours PRN Nausea and/or Vomiting  oxyCODONE    IR 5 milliGRAM(s) Oral every 6 hours PRN Severe Pain (7 - 10)  polyethylene glycol 3350 17 Gram(s) Oral daily PRN Constipation        I&O's Summary    06 Mar 2022 07:01  -  07 Mar 2022 07:00  --------------------------------------------------------  IN: 0 mL / OUT: 1450 mL / NET: -1450 mL        PHYSICAL EXAM:  Vital Signs Last 24 Hrs  T(C): 36.3 (07 Mar 2022 08:05), Max: 36.7 (06 Mar 2022 11:39)  T(F): 97.4 (07 Mar 2022 08:05), Max: 98 (06 Mar 2022 11:39)  HR: 56 (07 Mar 2022 08:05) (50 - 56)  BP: 134/66 (07 Mar 2022 08:05) (118/58 - 147/74)  BP(mean): --  RR: 18 (07 Mar 2022 08:05) (18 - 24)  SpO2: 91% (07 Mar 2022 08:05) (88% - 96%)        CONSTITUTIONAL: NAD, Obese  ENMT: Moist oral mucosa, no pharyngeal injection or exudates;  RESPIRATORY: Normal respiratory effort; lungs are clear to auscultation bilaterally  CARDIOVASCULAR: Regular rate and rhythm, normal S1 and S2,  3+ pitting lower extremity edema;  ABDOMEN: Nontender to palpation, normoactive bowel sounds, no rebound/guarding; No hepatosplenomegaly  MUSCLOSKELETAL:  no joint swelling or tenderness to palpation  PSYCH: A+O to person, place, and time; affect appropriate  NEUROLOGY: CN 2-12 are intact and symmetric; no gross sensory deficits;   SKIN: bilateral chronic venous insufficiency     LABS:                        10.6   6.46  )-----------( 169      ( 07 Mar 2022 06:20 )             36.4     03-07    143  |  101  |  37.3<H>  ----------------------------<  128<H>  4.6   |  35.0<H>  |  1.52<H>    Ca    8.8      07 Mar 2022 06:20  Phos  3.3     03-07  Mg     1.6     03-07    TPro  6.7  /  Alb  3.5  /  TBili  1.1  /  DBili  x   /  AST  15  /  ALT  11  /  AlkPhos  80  03-06    PT/INR - ( 06 Mar 2022 12:32 )   PT: 13.9 sec;   INR: 1.20 ratio         PTT - ( 06 Mar 2022 12:32 )  PTT:32.1 sec  CARDIAC MARKERS ( 07 Mar 2022 06:20 )  x     / 0.02 ng/mL / 32 U/L / x     / x      CARDIAC MARKERS ( 06 Mar 2022 17:30 )  x     / 0.02 ng/mL / 37 U/L / x     / x      CARDIAC MARKERS ( 06 Mar 2022 11:52 )  x     / 0.03 ng/mL / x     / x     / x              CAPILLARY BLOOD GLUCOSE      POCT Blood Glucose.: 115 mg/dL (07 Mar 2022 07:49)  POCT Blood Glucose.: 143 mg/dL (06 Mar 2022 22:49)  POCT Blood Glucose.: 110 mg/dL (06 Mar 2022 11:40)        RADIOLOGY & ADDITIONAL TESTS:  Results Reviewed:   Imaging Personally Reviewed:  Electrocardiogram Personally Reviewed:

## 2022-03-07 NOTE — PROGRESS NOTE ADULT - ASSESSMENT
81 year old male with history of Lymphedema, Morbid Obesity, COPD on 4LNC, Smoking (stopped 3 years ago), Lung Nodules, HTN, Hypothyroidism, RA and COVID-19 brought by daughter with hypoxia and scrotal swelling. In ER, he was found to be hypoxic to 88%, supplemental oxygen was increased to 5LNC. He was noted to have anasarca and was given a dose of Lasix 40 mg x 1.     Anasarca   - Suspected underlying heart failure  - Strict intake/output and daily weight  - ECHO  - Lasix 40 mg IVP q 12 hours  - [facility] called    Right Pleural Effusion   - Lasix as above  - Consider thoracocentesis if no improvement     Scrotal Swelling  - Likely due to above  - Scrotal US wnl  - Continue Lasix    Left Renal Lesion (suspected RCC)   - Dr. Marley Discussed with patient, he denies any history in past   - Needs further work up by Urology     Renal Insufficiency possible CKD  - Unknowns baseline renal function   - Avoid nephrotoxic medications  - Monitor renal function     BPH  - Shukla's catheter was placed in ER  - TOV in 3-4 days  - Flomax     Chronic respiratory failure due to  COPD  on 4L NC at baseline  - Symbicort and Spiriva (takes Trelegy at home)  - DuoNeb PRN    Hypomagnesemia  - replaced     HTN  - Continue Metoprolol and Amlodipine    DM 2  - HbA1c 6.9%  - Accu checks and ISS    Hypothyroidism  - Continue Synthroid     Rheumatoid Arthritis  - Gets weekly Methotrexate (on Friday)  - Will avoid NSAIDs due to renal insufficiency  - Oxycodone PRN    DVT Prophylaxis -- Heparin SQ    Advance Directives: DNR/I. ADA in alpha  Dispo: Remain inpt for IV diuresis and echo. PT ordered.

## 2022-03-07 NOTE — CONSULT NOTE ADULT - SUBJECTIVE AND OBJECTIVE BOX
[facility]ARDIOVASCULAR [facility], THE HEART CENTER                [address] [place] [address]                                     PHONE: [phone]                                       FAX: [phone]  [email or web address]      Reason for Consult: SOB    HPI:  Patient is a morbidly obese former smoker man with COPD, COVID 19 c/b respiratory failure with prolonged hospitalization, discharge on home O2 and now with chronic respiratory failure requiring 4L O2, hypertension, suspected lymphedema, who has had increasingly sedentary lifestyle since his last hospitalization who presents with dyspnea, increased edema and scrotal edema, found to have acute on chronic hypoxic respiratory failure     PAST MEDICAL & SURGICAL HISTORY:  Chronic obstructive pulmonary disease (COPD)    DM (diabetes mellitus), type 2    HTN (hypertension)    HLD (hyperlipidemia)    Lung nodules    2019 novel coronavirus disease (COVID-19)    History of hernia repair        Telemetry:     PREVIOUS DIAGNOSTIC TESTING:      EKG:     ECHO FINDINGS:    STRESS FINDINGS:    CATHETERIZATION FINDINGS:    MEDICATIONS  (STANDING):  amLODIPine   Tablet 10 milliGRAM(s) Oral daily  budesonide  80 MICROgram(s)/formoterol 4.5 MICROgram(s) Inhaler 2 Puff(s) Inhalation two times a day  cholecalciferol 1000 Unit(s) Oral daily  cyanocobalamin 1000 MICROGram(s) Oral daily  dextrose 40% Gel 15 Gram(s) Oral once  dextrose 5%. 1000 milliLiter(s) (50 mL/Hr) IV Continuous <Continuous>  dextrose 5%. 1000 milliLiter(s) (100 mL/Hr) IV Continuous <Continuous>  dextrose 50% Injectable 25 Gram(s) IV Push once  dextrose 50% Injectable 12.5 Gram(s) IV Push once  dextrose 50% Injectable 25 Gram(s) IV Push once  folic acid 1 milliGRAM(s) Oral daily  furosemide   Injectable 40 milliGRAM(s) IV Push every 12 hours  glucagon  Injectable 1 milliGRAM(s) IntraMuscular once  heparin   Injectable 5000 Unit(s) SubCutaneous every 8 hours  insulin lispro (ADMELOG) corrective regimen sliding scale   SubCutaneous three times a day before meals  insulin lispro (ADMELOG) corrective regimen sliding scale   SubCutaneous at bedtime  levothyroxine 300 MICROGram(s) Oral daily  metoprolol succinate  milliGRAM(s) Oral daily  nystatin Powder 1 Application(s) Topical three times a day  senna 2 Tablet(s) Oral at bedtime  tamsulosin 0.4 milliGRAM(s) Oral at bedtime  tiotropium 18 MICROgram(s) Capsule 1 Capsule(s) Inhalation daily    MEDICATIONS  (PRN):  acetaminophen     Tablet .. 650 milliGRAM(s) Oral every 6 hours PRN Temp greater or equal to 38C (100.4F), Mild Pain (1 - 3), Moderate Pain (4 - 6)  albuterol/ipratropium for Nebulization 3 milliLiter(s) Nebulizer every 6 hours PRN Shortness of Breath and/or Wheezing  ondansetron Injectable 4 milliGRAM(s) IV Push every 6 hours PRN Nausea and/or Vomiting  oxyCODONE    IR 5 milliGRAM(s) Oral every 6 hours PRN Severe Pain (7 - 10)  polyethylene glycol 3350 17 Gram(s) Oral daily PRN Constipation      FAMILY HISTORY:  Family history of heart disease (Father, Grandparent)        SOCIAL HISTORY:  CIGARETTES: former   ALCOHOL: denies     ROS: Negative other than as mentioned in HPI.    Vital Signs Last 24 Hrs  T(C): 36.4 (07 Mar 2022 15:30), Max: 36.6 (06 Mar 2022 18:07)  T(F): 97.6 (07 Mar 2022 15:30), Max: 97.8 (06 Mar 2022 18:07)  HR: 60 (07 Mar 2022 15:30) (50 - 60)  BP: 151/69 (07 Mar 2022 15:30) (118/58 - 151/69)  BP(mean): --  RR: 20 (07 Mar 2022 15:30) (18 - 24)  SpO2: 89% (07 Mar 2022 15:30) (88% - 92%)    PHYSICAL EXAM:  General: chronically ill appearing, morbidly obese  HEENT: Head; normocephalic, atraumatic. sclera anicteric, MMM, neck is plethoric, unable to appreciate JVD  CARDIOVASCULAR; 2/6 STACIA, no rub, gallop or lift. Normal S1 and S2.  LUNGS; No rales, rhonchi or wheeze. Normal breath sounds bilaterally.  ABDOMEN ; distended abdomen, NABS  EXTREMITIES; (+) hyperkeratosis, (+) LE edema   SKIN; warm and dry with normal turgor.  NEURO; Alert/oriented x 3/normal motor exam.     PSYCH; normal affect.        I&O's Detail    06 Mar 2022 07:01  -  07 Mar 2022 07:00  --------------------------------------------------------  IN:  Total IN: 0 mL    OUT:    Indwelling Catheter - Urethral (mL): 1450 mL  Total OUT: 1450 mL    Total NET: -1450 mL      07 Mar 2022 07:01  -  07 Mar 2022 17:05  --------------------------------------------------------  IN:  Total IN: 0 mL    OUT:    Indwelling Catheter - Urethral (mL): 700 mL  Total OUT: 700 mL    Total NET: -700 mL          LABS:                        10.6   6.46  )-----------( 169      ( 07 Mar 2022 06:20 )             36.4     03-07    143  |  101  |  37.3<H>  ----------------------------<  128<H>  4.6   |  35.0<H>  |  1.52<H>    Ca    8.8      07 Mar 2022 06:20  Phos  3.3     03-07  Mg     1.6     03-07    TPro  6.7  /  Alb  3.5  /  TBili  1.1  /  DBili  x   /  AST  15  /  ALT  11  /  AlkPhos  80  03-06    CARDIAC MARKERS ( 07 Mar 2022 06:20 )  x     / 0.02 ng/mL / 32 U/L / x     / x      CARDIAC MARKERS ( 06 Mar 2022 17:30 )  x     / 0.02 ng/mL / 37 U/L / x     / x      CARDIAC MARKERS ( 06 Mar 2022 11:52 )  x     / 0.03 ng/mL / x     / x     / x          PT/INR - ( 06 Mar 2022 12:32 )   PT: 13.9 sec;   INR: 1.20 ratio         PTT - ( 06 Mar 2022 12:32 )  PTT:32.1 sec    I&O's Summary    06 Mar 2022 07:01  -  07 Mar 2022 07:00  --------------------------------------------------------  IN: 0 mL / OUT: 1450 mL / NET: -1450 mL    07 Mar 2022 07:01  -  07 Mar 2022 17:05  --------------------------------------------------------  IN: 0 mL / OUT: 700 mL / NET: -700 mL        RADIOLOGY & ADDITIONAL STUDIES:

## 2022-03-07 NOTE — CHART NOTE - NSCHARTNOTEFT_GEN_A_CORE
PA NOTE-MEDICINE    Called by RN due to pt experiencing 6 Beats of V-Tach  Pt is asymptomatic     T(C): 36.5 (07 Mar 2022 04:51), Max: 36.7 (06 Mar 2022 11:39)  T(F): 97.7 (07 Mar 2022 04:51), Max: 98 (06 Mar 2022 11:39)  HR: 53 (07 Mar 2022 04:51) (50 - 54)  BP: 118/58 (07 Mar 2022 04:51) (118/58 - 147/74)  RR: 18 (07 Mar 2022 04:51) (18 - 24)  SpO2: 90% (07 Mar 2022 04:51) (88% - 96%)    Added Phos Level to AM BMP/Mag    Continue to Monitor Pt   Recall PA if any reoccurrence of V-tach or for any changes in pt status   Will Sign out to am Team to follow Lab results

## 2022-03-08 DIAGNOSIS — J96.01 ACUTE RESPIRATORY FAILURE WITH HYPOXIA: ICD-10-CM

## 2022-03-08 DIAGNOSIS — J44.9 CHRONIC OBSTRUCTIVE PULMONARY DISEASE, UNSPECIFIED: ICD-10-CM

## 2022-03-08 DIAGNOSIS — J96.21 ACUTE AND CHRONIC RESPIRATORY FAILURE WITH HYPOXIA: ICD-10-CM

## 2022-03-08 LAB
ALBUMIN SERPL ELPH-MCNC: 3.4 G/DL — SIGNIFICANT CHANGE UP (ref 3.3–5.2)
ALP SERPL-CCNC: 76 U/L — SIGNIFICANT CHANGE UP (ref 40–120)
ALT FLD-CCNC: 9 U/L — SIGNIFICANT CHANGE UP
ANION GAP SERPL CALC-SCNC: 7 MMOL/L — SIGNIFICANT CHANGE UP (ref 5–17)
AST SERPL-CCNC: 15 U/L — SIGNIFICANT CHANGE UP
BILIRUB SERPL-MCNC: 1.2 MG/DL — SIGNIFICANT CHANGE UP (ref 0.4–2)
BUN SERPL-MCNC: 29.8 MG/DL — HIGH (ref 8–20)
CALCIUM SERPL-MCNC: 8.9 MG/DL — SIGNIFICANT CHANGE UP (ref 8.6–10.2)
CHLORIDE SERPL-SCNC: 100 MMOL/L — SIGNIFICANT CHANGE UP (ref 98–107)
CO2 SERPL-SCNC: 38 MMOL/L — HIGH (ref 22–29)
CREAT SERPL-MCNC: 1.25 MG/DL — SIGNIFICANT CHANGE UP (ref 0.5–1.3)
EGFR: 59 ML/MIN/1.73M2 — LOW
GLUCOSE BLDC GLUCOMTR-MCNC: 127 MG/DL — HIGH (ref 70–99)
GLUCOSE BLDC GLUCOMTR-MCNC: 130 MG/DL — HIGH (ref 70–99)
GLUCOSE BLDC GLUCOMTR-MCNC: 132 MG/DL — HIGH (ref 70–99)
GLUCOSE BLDC GLUCOMTR-MCNC: 213 MG/DL — HIGH (ref 70–99)
GLUCOSE SERPL-MCNC: 124 MG/DL — HIGH (ref 70–99)
HCT VFR BLD CALC: 34.7 % — LOW (ref 39–50)
HGB BLD-MCNC: 10.4 G/DL — LOW (ref 13–17)
MAGNESIUM SERPL-MCNC: 1.5 MG/DL — LOW (ref 1.6–2.6)
MCHC RBC-ENTMCNC: 29.5 PG — SIGNIFICANT CHANGE UP (ref 27–34)
MCHC RBC-ENTMCNC: 30 GM/DL — LOW (ref 32–36)
MCV RBC AUTO: 98.3 FL — SIGNIFICANT CHANGE UP (ref 80–100)
PLATELET # BLD AUTO: 168 K/UL — SIGNIFICANT CHANGE UP (ref 150–400)
POTASSIUM SERPL-MCNC: 4.3 MMOL/L — SIGNIFICANT CHANGE UP (ref 3.5–5.3)
POTASSIUM SERPL-SCNC: 4.3 MMOL/L — SIGNIFICANT CHANGE UP (ref 3.5–5.3)
PROT SERPL-MCNC: 6.8 G/DL — SIGNIFICANT CHANGE UP (ref 6.6–8.7)
RBC # BLD: 3.53 M/UL — LOW (ref 4.2–5.8)
RBC # FLD: 17.1 % — HIGH (ref 10.3–14.5)
SODIUM SERPL-SCNC: 144 MMOL/L — SIGNIFICANT CHANGE UP (ref 135–145)
WBC # BLD: 7.17 K/UL — SIGNIFICANT CHANGE UP (ref 3.8–10.5)
WBC # FLD AUTO: 7.17 K/UL — SIGNIFICANT CHANGE UP (ref 3.8–10.5)

## 2022-03-08 PROCEDURE — 93970 EXTREMITY STUDY: CPT | Mod: 26

## 2022-03-08 PROCEDURE — 99233 SBSQ HOSP IP/OBS HIGH 50: CPT

## 2022-03-08 PROCEDURE — 99223 1ST HOSP IP/OBS HIGH 75: CPT

## 2022-03-08 RX ORDER — MAGNESIUM SULFATE 500 MG/ML
2 VIAL (ML) INJECTION EVERY 4 HOURS
Refills: 0 | Status: COMPLETED | OUTPATIENT
Start: 2022-03-08 | End: 2022-03-08

## 2022-03-08 RX ORDER — FLUTICASONE FUROATE, UMECLIDINIUM BROMIDE AND VILANTEROL TRIFENATATE 200; 62.5; 25 UG/1; UG/1; UG/1
1 POWDER RESPIRATORY (INHALATION) DAILY
Refills: 0 | Status: DISCONTINUED | OUTPATIENT
Start: 2022-03-08 | End: 2022-03-13

## 2022-03-08 RX ORDER — ISOSORBIDE MONONITRATE 60 MG/1
30 TABLET, EXTENDED RELEASE ORAL DAILY
Refills: 0 | Status: DISCONTINUED | OUTPATIENT
Start: 2022-03-08 | End: 2022-03-13

## 2022-03-08 RX ADMIN — HEPARIN SODIUM 5000 UNIT(S): 5000 INJECTION INTRAVENOUS; SUBCUTANEOUS at 21:39

## 2022-03-08 RX ADMIN — Medication 25 GRAM(S): at 10:15

## 2022-03-08 RX ADMIN — FLUTICASONE FUROATE, UMECLIDINIUM BROMIDE AND VILANTEROL TRIFENATATE 1 PUFF(S): 200; 62.5; 25 POWDER RESPIRATORY (INHALATION) at 10:15

## 2022-03-08 RX ADMIN — Medication 40 MILLIGRAM(S): at 17:58

## 2022-03-08 RX ADMIN — Medication 1 MILLIGRAM(S): at 10:15

## 2022-03-08 RX ADMIN — NYSTATIN CREAM 1 APPLICATION(S): 100000 CREAM TOPICAL at 13:47

## 2022-03-08 RX ADMIN — Medication 40 MILLIGRAM(S): at 10:15

## 2022-03-08 RX ADMIN — Medication 100 MILLIGRAM(S): at 06:03

## 2022-03-08 RX ADMIN — NYSTATIN CREAM 1 APPLICATION(S): 100000 CREAM TOPICAL at 21:39

## 2022-03-08 RX ADMIN — Medication 300 MICROGRAM(S): at 06:03

## 2022-03-08 RX ADMIN — Medication 25 GRAM(S): at 12:29

## 2022-03-08 RX ADMIN — SENNA PLUS 2 TABLET(S): 8.6 TABLET ORAL at 21:33

## 2022-03-08 RX ADMIN — HEPARIN SODIUM 5000 UNIT(S): 5000 INJECTION INTRAVENOUS; SUBCUTANEOUS at 06:05

## 2022-03-08 RX ADMIN — Medication 40 MILLIGRAM(S): at 21:36

## 2022-03-08 RX ADMIN — Medication 1000 UNIT(S): at 10:14

## 2022-03-08 RX ADMIN — HEPARIN SODIUM 5000 UNIT(S): 5000 INJECTION INTRAVENOUS; SUBCUTANEOUS at 13:46

## 2022-03-08 RX ADMIN — Medication 25 MILLIGRAM(S): at 13:47

## 2022-03-08 RX ADMIN — PREGABALIN 1000 MICROGRAM(S): 225 CAPSULE ORAL at 10:15

## 2022-03-08 RX ADMIN — TAMSULOSIN HYDROCHLORIDE 0.4 MILLIGRAM(S): 0.4 CAPSULE ORAL at 21:33

## 2022-03-08 RX ADMIN — Medication 25 MILLIGRAM(S): at 21:33

## 2022-03-08 RX ADMIN — Medication 25 MILLIGRAM(S): at 06:04

## 2022-03-08 RX ADMIN — NYSTATIN CREAM 1 APPLICATION(S): 100000 CREAM TOPICAL at 06:05

## 2022-03-08 RX ADMIN — ISOSORBIDE MONONITRATE 30 MILLIGRAM(S): 60 TABLET, EXTENDED RELEASE ORAL at 13:47

## 2022-03-08 NOTE — PHYSICAL THERAPY INITIAL EVALUATION ADULT - PERTINENT HX OF CURRENT PROBLEM, REHAB EVAL
81 year old male with history of Lymphedema, Morbid Obesity, COPD on 4LNC, Smoking (stopped 3 years ago), Lung Nodules, HTN, Hypothyroidism, RA and COVID-19 brought by daughter with hypoxia and scrotal swelling. In ER, he was found to be hypoxic to 88%, supplemental oxygen was increased to 5LNC. He was noted to have anasarca and was given a dose of Lasix 40 mg x 1.

## 2022-03-08 NOTE — CONSULT NOTE ADULT - TIME BILLING
greater than 50% of time spent reviewing labs, notes, orders and radiographs, coordinating care  discussed with pt at bedside and nursing

## 2022-03-08 NOTE — PROGRESS NOTE ADULT - SUBJECTIVE AND OBJECTIVE BOX
[facility], [facility]                              [address] [address]                                                 PHONE: [phone]                                                 FAX: [phone]  -----------------------------------------------------------------------------------------------  Pt seen and examined. FU for  shortness of breath      Overnight events/Complaints: Pt breathing better. Still diuresing well. Abdominal distension improving.    Vital Signs Last 24 Hrs  T(C): 36.4 (08 Mar 2022 10:12), Max: 36.7 (07 Mar 2022 22:50)  T(F): 97.5 (08 Mar 2022 10:12), Max: 98.1 (07 Mar 2022 22:50)  HR: 60 (08 Mar 2022 10:12) (60 - 71)  BP: 137/73 (08 Mar 2022 10:12) (137/73 - 170/69)  BP(mean): --  RR: 20 (08 Mar 2022 10:12) (19 - 20)  SpO2: 94% (08 Mar 2022 10:12) (89% - 97%)  I&O's Summary    07 Mar 2022 07:01  -  08 Mar 2022 07:00  --------------------------------------------------------  IN: 0 mL / OUT: 2300 mL / NET: -2300 mL        PHYSICAL EXAM:  Constitutional: Appears well; alert and co-operative  HEENT:     Head: Normocephalic and atraumatic  Neck: supple. No JVD  Cardiovascular: regular S1 S2  Respiratory: Lungs clear to auscultation; no crepitations, no wheeze  Gastrointestinal:  Soft, Non-tender, + BS	  Musculoskeletal: Normal range of motion. +++ edema  Skin: Warm and dry. No cyanosis . No diaphoresis.  Neurologic: Alert oriented to time place and person. Normal strength and no tremor.        LABS:                        10.4   7.17  )-----------( 168      ( 08 Mar 2022 06:55 )             34.7     03-08    144  |  100  |  29.8<H>  ----------------------------<  124<H>  4.3   |  38.0<H>  |  1.25    Ca    8.9      08 Mar 2022 06:55  Phos  3.3     03-07  Mg     1.5     03-08    TPro  6.8  /  Alb  3.4  /  TBili  1.2  /  DBili  x   /  AST  15  /  ALT  9   /  AlkPhos  76  03-08    CARDIAC MARKERS ( 07 Mar 2022 06:20 )  x     / 0.02 ng/mL / 32 U/L / x     / x      CARDIAC MARKERS ( 06 Mar 2022 17:30 )  x     / 0.02 ng/mL / 37 U/L / x     / x      CARDIAC MARKERS ( 06 Mar 2022 11:52 )  x     / 0.03 ng/mL / x     / x     / x          PT/INR - ( 06 Mar 2022 12:32 )   PT: 13.9 sec;   INR: 1.20 ratio         PTT - ( 06 Mar 2022 12:32 )  PTT:32.1 sec    RADIOLOGY & ADDITIONAL STUDIES: (reviewed)  CXR was independently visualized/reviewed  and demonstrated: congestion    CARDIOLOGY TESTING:(reviewed)     ECHOCARDIOGRAM independently visualized/reviewed and demonstrated :   Summary:   1. Left ventricular ejection fraction, by visual estimation, is 50 to   55%.   2. Normal global left ventricular systolic function.   3. Moderatelyincreased LV wall thickness.   4. Moderately enlarged right ventricle. Mildly reduced RV systolic   function.   5. Right ventricular volume overload.   6. Moderate biatrial enlargement.   7. Moderate tricuspid regurgitation.   8. Estimated pulmonary artery systolic pressure is 52.2 mmHg assuming a   right atrial pressure of 15 mmHg, which is consistent with moderate   pulmonary hypertension.   9. Thickening of the anterior and posterior mitral valve leaflets.  10. Mild mitral annular calcification.  11. Trace mitral valve regurgitation.  12. Dilatation of the ascending aorta, measuring approx 3.67 cm.  13. Sclerotic aortic valve with normal opening.  14. There is no evidence of pericardial effusion.  15. Endocardial visualization was enhancedwith intravenous echo contrast.  16. There are no prior studies on this patient for comparison purposes.    Gayla Ramirez MD Electronically signed on 3/7/2022 at 12:38:54 PM    MEDICATIONS:(reviewed)  MEDICATIONS  (STANDING):  cholecalciferol 1000 Unit(s) Oral daily  cyanocobalamin 1000 MICROGram(s) Oral daily  dextrose 40% Gel 15 Gram(s) Oral once  dextrose 5%. 1000 milliLiter(s) (50 mL/Hr) IV Continuous <Continuous>  dextrose 5%. 1000 milliLiter(s) (100 mL/Hr) IV Continuous <Continuous>  dextrose 50% Injectable 25 Gram(s) IV Push once  dextrose 50% Injectable 12.5 Gram(s) IV Push once  dextrose 50% Injectable 25 Gram(s) IV Push once  fluticasone furoate/umeclidinium/vilanterol 100-62.5-25 MICROgram(s) Inhaler 1 Puff(s) Inhalation daily  folic acid 1 milliGRAM(s) Oral daily  furosemide   Injectable 40 milliGRAM(s) IV Push every 12 hours  glucagon  Injectable 1 milliGRAM(s) IntraMuscular once  heparin   Injectable 5000 Unit(s) SubCutaneous every 8 hours  hydrALAZINE 25 milliGRAM(s) Oral three times a day  insulin lispro (ADMELOG) corrective regimen sliding scale   SubCutaneous three times a day before meals  insulin lispro (ADMELOG) corrective regimen sliding scale   SubCutaneous at bedtime  levothyroxine 300 MICROGram(s) Oral daily  magnesium sulfate  IVPB 2 Gram(s) IV Intermittent every 4 hours  metoprolol succinate  milliGRAM(s) Oral daily  nystatin Powder 1 Application(s) Topical three times a day  senna 2 Tablet(s) Oral at bedtime  tamsulosin 0.4 milliGRAM(s) Oral at bedtime      ASSESSMENT AND PLAN:    81y Male with prior history of morbidly obese former smoker man with COPD, COVID 19 c/b respiratory failure with prolonged hospitalization, discharge on home O2 and now with chronic respiratory failure requiring 4L O2, hypertension, suspected lymphedema, who has had increasingly sedentary lifestyle since his last hospitalization who presents with dyspnea, increased edema and scrotal edema, found to have acute on chronic hypoxic respiratory failure     acute on chronic hypoxic respiratory failure/hypervolemia/NSVT  - continue lasix 40mg IV   - continue imdur 30mg with hydralazine 25mg TID for further afterload reduction  - continue metoprolol   - strict I/O and daily weight    - Pulmonary consultation- reportedly was following with  Lung group  - TTE with moderate pHTN  - renal function improving  - eventual LHC/RHC    Plan discussed with Medicine team and Dr. Taveras

## 2022-03-08 NOTE — PHYSICAL THERAPY INITIAL EVALUATION ADULT - ADDITIONAL COMMENTS
pt states he lives with his daughter and son-in-law in a 2-story house with 1 step to enter. states he has a cane and standard walker(does not like rolling walkers) at home. until 2 weeks ago, he was amb with walker independently. since then, he has required assist from daughter(who works) or son-in-law(who is between jobs). also has a shower chair.

## 2022-03-08 NOTE — PROGRESS NOTE ADULT - ASSESSMENT
81 year old male with history of Lymphedema, Morbid Obesity, COPD on 4LNC, Smoking (stopped 3 years ago), Lung Nodules, HTN, Hypothyroidism, RA and COVID-19 brought by daughter with hypoxia and scrotal swelling. In ER, he was found to be hypoxic to 88%, supplemental oxygen was increased to 5LNC. He was noted to have anasarca and was given a dose of Lasix 40 mg x 1.     Anasarca/ Acute on chronic HFpEF (EF 50-55%)/Moderate Pulmonary Hypertension, unchanged  - Strict intake/output and daily weight  - Lasix 40 mg IVP q 12 hours  - Hydralazine 25mg TID  - Imdur 30mg daily  - [facility] following, will need RHD/LHC when volume status improved.     Right Pleural Effusion   - Lasix as above  - Consider thoracocentesis if no improvement     Chronic respiratory failure due to  COPD  on 4L NC at baseline  - Symbicort and Spiriva (takes Trelegy at home)  - DuoNeb PRN  - Pulmonary consulted  - O2 sats 88% and above acceptable     Scrotal Swelling  - Likely due to above  - Scrotal US wnl  - Continue Lasix    Left Renal Lesion (suspected RCC)   - Dr. Marley Discussed with patient, he denies any history in past   - Needs further work up by Urology     Renal Insufficiency possible CKD, stable  - Unknowns baseline renal function   - Avoid nephrotoxic medications  - Monitor renal function     BPH  - Shukla's catheter was placed in ER  - TOV in 3-4 days  - Flomax     Hypomagnesemia  - replaced     HTN  - Continue Metoprolol    DM 2  - HbA1c 6.9%  - Accu checks and ISS    Hypothyroidism  - Continue Synthroid     Rheumatoid Arthritis  - Gets weekly Methotrexate (on Friday)  - Will avoid NSAIDs due to renal insufficiency  - Oxycodone PRN    DVT Prophylaxis -- Heparin SQ    Advance Directives: DNR/I. LIANETST in alpha  Dispo: Remain inpt for IV diuresis. PT ordered.

## 2022-03-08 NOTE — PHYSICAL THERAPY INITIAL EVALUATION ADULT - LEVEL OF INDEPENDENCE: SIT/SUPINE, REHAB EVAL
25 y.o F A&Ox3 with no significant pmh presents to the ED c.o fevers, non productive cough, chills, body aches, SOB. Pt. reports she developed nonproductive cough 3 days ago and then today spiked a fever associated with worsening SOB. Febrile to 102.4 orally. Pt. denies taking Tylenol today for fever. States she is having difficulty breathing because she is "unable to breath through her nose." Pt. sating above 95% on RA. Pt. de sated to 95% on RA post ambulation. Reports she works for [employer] for [employer]. Also endorses nausea, denies any episodes of emesis. Pt. tachycardic to the 100s. MD aware. Lung sounds clear. Pt. appears uncomfortable at this time. Tachypnea noted. Will reassess. Iso precautions in place. Currently has menstrual period.
left oob in chair

## 2022-03-08 NOTE — CONSULT NOTE ADULT - ASSESSMENT
Acute on chronic hypoxemic, hypercapnic vent failure  CHFpEF- dilated Left atrium consistent  COPD/DIEGO overlap   Continue diuretics, follow lytes  Trelegy daily, prn neb  short course medrol, prn abx, no fever or leucocytosis  Duplex lower ext   ABG  Nocturnal NIV trial discussed with pt  prognosis guarded, DNR

## 2022-03-08 NOTE — PHYSICAL THERAPY INITIAL EVALUATION ADULT - STANDING BALANCE: DYNAMIC, REHAB EVAL
"Chief Complaint   Patient presents with     Recheck Medication       Initial /88 (BP Location: Right arm, Patient Position: Chair, Cuff Size: Adult Regular)  Pulse 83  Resp 18  Ht 5' 11\" (1.803 m)  Wt 231 lb 11.2 oz (105.1 kg)  SpO2 99%  BMI 32.32 kg/m2 Estimated body mass index is 32.32 kg/(m^2) as calculated from the following:    Height as of this encounter: 5' 11\" (1.803 m).    Weight as of this encounter: 231 lb 11.2 oz (105.1 kg).  Medication Reconciliation: aixa Vasquez      " fair balance

## 2022-03-08 NOTE — CONSULT NOTE ADULT - SUBJECTIVE AND OBJECTIVE BOX
PULMONARY CONSULT NOTE      HILDA VELOZGEO-[id number]    Patient is a 81y old  Male who presents with a chief complaint of Scrotal swelling (08 Mar 2022 10:45)  81 year old male with history of Lymphedema, Morbid Obesity, COPD on 4LNC, Smoking (stopped 3 years ago), Lung Nodules, HTN, Hypothyroidism, RA and COVID-19 brought by daughter with hypoxia and scrotal swelling. As per patient, he has noticed scrotal swelling for 10 days and it is getting worse along with lymphedema. He is also complaining of exertional dyspnea. Denies orthopnea or paroxysmal nocturnal dyspnea. Denies fever, sick contacts or recent travel.   Patient denies history of CHF. Has not seen Cardiologist in 3 years. He takes Lasix for lymphedema however he is not compliant with it.   In ER, he was found to be hypoxic to 88%, supplemental oxygen was increased to 5LNC. He was noted to have anasarca and was given a dose of Lasix 40 mg x 1.       HISTORY OF PRESENT ILLNESS:    MEDICATIONS  (STANDING):  cholecalciferol 1000 Unit(s) Oral daily  cyanocobalamin 1000 MICROGram(s) Oral daily  dextrose 40% Gel 15 Gram(s) Oral once  dextrose 5%. 1000 milliLiter(s) (50 mL/Hr) IV Continuous <Continuous>  dextrose 5%. 1000 milliLiter(s) (100 mL/Hr) IV Continuous <Continuous>  dextrose 50% Injectable 25 Gram(s) IV Push once  dextrose 50% Injectable 12.5 Gram(s) IV Push once  dextrose 50% Injectable 25 Gram(s) IV Push once  fluticasone furoate/umeclidinium/vilanterol 100-62.5-25 MICROgram(s) Inhaler 1 Puff(s) Inhalation daily  folic acid 1 milliGRAM(s) Oral daily  furosemide   Injectable 40 milliGRAM(s) IV Push every 12 hours  glucagon  Injectable 1 milliGRAM(s) IntraMuscular once  heparin   Injectable 5000 Unit(s) SubCutaneous every 8 hours  hydrALAZINE 25 milliGRAM(s) Oral three times a day  insulin lispro (ADMELOG) corrective regimen sliding scale   SubCutaneous three times a day before meals  insulin lispro (ADMELOG) corrective regimen sliding scale   SubCutaneous at bedtime  isosorbide   mononitrate ER Tablet (IMDUR) 30 milliGRAM(s) Oral daily  levothyroxine 300 MICROGram(s) Oral daily  magnesium sulfate  IVPB 2 Gram(s) IV Intermittent every 4 hours  metoprolol succinate  milliGRAM(s) Oral daily  nystatin Powder 1 Application(s) Topical three times a day  senna 2 Tablet(s) Oral at bedtime  tamsulosin 0.4 milliGRAM(s) Oral at bedtime      MEDICATIONS  (PRN):  acetaminophen     Tablet .. 650 milliGRAM(s) Oral every 6 hours PRN Temp greater or equal to 38C (100.4F), Mild Pain (1 - 3), Moderate Pain (4 - 6)  albuterol/ipratropium for Nebulization 3 milliLiter(s) Nebulizer every 6 hours PRN Shortness of Breath and/or Wheezing  ondansetron Injectable 4 milliGRAM(s) IV Push every 6 hours PRN Nausea and/or Vomiting  oxyCODONE    IR 5 milliGRAM(s) Oral every 6 hours PRN Severe Pain (7 - 10)  polyethylene glycol 3350 17 Gram(s) Oral daily PRN Constipation      Allergies    sulfamethoxazole (Urticaria)    Intolerances        PAST MEDICAL & SURGICAL HISTORY:  Chronic obstructive pulmonary disease (COPD)    DM (diabetes mellitus), type 2    HTN (hypertension)    HLD (hyperlipidemia)    Lung nodules    2019 novel coronavirus disease (COVID-19)    History of hernia repair        FAMILY HISTORY:  Family history of heart disease (Father, Grandparent)        SOCIAL HISTORY  Smoking History:     REVIEW OF SYSTEMS:    CONSTITUTIONAL:  No fevers, chills, sweats    HEENT:  Eyes:  No diplopia or blurred vision. ENT:  No earache, sore throat or runny nose.    CARDIOVASCULAR:  No pressure, squeezing, tightness, or heaviness about the chest; no palpitations.    RESPIRATORY:  see HPI    GASTROINTESTINAL:  No abdominal pain, nausea, vomiting or diarrhea.    GENITOURINARY:  No dysuria, frequency or urgency.    NEUROLOGIC:  No paresthesias, fasciculations, seizures or weakness.    PSYCHIATRIC:  No disorder of thought or mood.    Vital Signs Last 24 Hrs  T(C): 36.4 (08 Mar 2022 10:12), Max: 36.7 (07 Mar 2022 22:50)  T(F): 97.5 (08 Mar 2022 10:12), Max: 98.1 (07 Mar 2022 22:50)  HR: 60 (08 Mar 2022 10:12) (60 - 71)  BP: 137/73 (08 Mar 2022 10:12) (132/75 - 170/69)  BP(mean): --  RR: 20 (08 Mar 2022 10:12) (19 - 20)  SpO2: 94% (08 Mar 2022 10:12) (89% - 97%)    PHYSICAL EXAMINATION:    GENERAL: The patient is a well-developed, well-nourished _____in no apparent distress.     HEENT: Head is normocephalic and atraumatic. Extraocular muscles are intact. Mucous membranes are moist.     NECK: Supple.     LUNGS: Clear to auscultation without wheezing, rales, or rhonchi. Respirations unlabored    HEART: Regular rate and rhythm without murmur.    ABDOMEN: Soft, nontender, and nondistended.  No hepatosplenomegaly is noted.    EXTREMITIES: Without any cyanosis, clubbing, rash, lesions or edema.    NEUROLOGIC: Grossly intact.      LABS:                        10.4   7.17  )-----------( 168      ( 08 Mar 2022 06:55 )             34.7     03-08    144  |  100  |  29.8<H>  ----------------------------<  124<H>  4.3   |  38.0<H>  |  1.25    Ca    8.9      08 Mar 2022 06:55  Phos  3.3     03-07  Mg     1.5     03-08    TPro  6.8  /  Alb  3.4  /  TBili  1.2  /  DBili  x   /  AST  15  /  ALT  9   /  AlkPhos  76  03-08    PT/INR - ( 06 Mar 2022 12:32 )   PT: 13.9 sec;   INR: 1.20 ratio         PTT - ( 06 Mar 2022 12:32 )  PTT:32.1 sec      CARDIAC MARKERS ( 07 Mar 2022 06:20 )  x     / 0.02 ng/mL / 32 U/L / x     / x      CARDIAC MARKERS ( 06 Mar 2022 17:30 )  x     / 0.02 ng/mL / 37 U/L / x     / x            Serum Pro-Brain Natriuretic Peptide: 3840 pg/mL (03-06-22 @ 11:52)          MICROBIOLOGY:    RADIOLOGY & ADDITIONAL STUDIES:  CXR ,  CT scans, office notes reviewed PULMONARY CONSULT NOTE      HILDA VELOZGEO-[id number]    Patient is a 81y old  Male who presents with a chief complaint of Scrotal swelling (08 Mar 2022 10:45)  81 year old male with history of Lymphedema, Morbid Obesity, COPD on 4LNC, Smoking (stopped 3 years ago), Lung Nodules, HTN, Hypothyroidism, RA and COVID-19 brought by daughter with hypoxia and scrotal swelling. As per patient, he has noticed scrotal swelling for 10 days and it is getting worse along with lymphedema. He is also complaining of exertional dyspnea. Denies orthopnea or paroxysmal nocturnal dyspnea. Denies fever, sick contacts or recent travel.   Patient denies history of CHF. Has not seen Cardiologist in 3 years. He takes Lasix for lymphedema however he is not compliant with it.   In ER, he was found to be hypoxic to 88%, supplemental oxygen was increased to 5LNC. He was noted to have anasarca and was given a dose of Lasix 40 mg x 1.       HISTORY OF PRESENT ILLNESS:  seen in our office x 1- [facility]  Prior Pope chest  no records sent to us  on Trelegy, not on cpap/bipap  worsening leg edema and   desat at home  sputum green, no chest pain  02 at home 4 L  MEDICATIONS  (STANDING):  cholecalciferol 1000 Unit(s) Oral daily  cyanocobalamin 1000 MICROGram(s) Oral daily  dextrose 40% Gel 15 Gram(s) Oral once  dextrose 5%. 1000 milliLiter(s) (50 mL/Hr) IV Continuous <Continuous>  dextrose 5%. 1000 milliLiter(s) (100 mL/Hr) IV Continuous <Continuous>  dextrose 50% Injectable 25 Gram(s) IV Push once  dextrose 50% Injectable 12.5 Gram(s) IV Push once  dextrose 50% Injectable 25 Gram(s) IV Push once  fluticasone furoate/umeclidinium/vilanterol 100-62.5-25 MICROgram(s) Inhaler 1 Puff(s) Inhalation daily  folic acid 1 milliGRAM(s) Oral daily  furosemide   Injectable 40 milliGRAM(s) IV Push every 12 hours  glucagon  Injectable 1 milliGRAM(s) IntraMuscular once  heparin   Injectable 5000 Unit(s) SubCutaneous every 8 hours  hydrALAZINE 25 milliGRAM(s) Oral three times a day  insulin lispro (ADMELOG) corrective regimen sliding scale   SubCutaneous three times a day before meals  insulin lispro (ADMELOG) corrective regimen sliding scale   SubCutaneous at bedtime  isosorbide   mononitrate ER Tablet (IMDUR) 30 milliGRAM(s) Oral daily  levothyroxine 300 MICROGram(s) Oral daily  magnesium sulfate  IVPB 2 Gram(s) IV Intermittent every 4 hours  metoprolol succinate  milliGRAM(s) Oral daily  nystatin Powder 1 Application(s) Topical three times a day  senna 2 Tablet(s) Oral at bedtime  tamsulosin 0.4 milliGRAM(s) Oral at bedtime      MEDICATIONS  (PRN):  acetaminophen     Tablet .. 650 milliGRAM(s) Oral every 6 hours PRN Temp greater or equal to 38C (100.4F), Mild Pain (1 - 3), Moderate Pain (4 - 6)  albuterol/ipratropium for Nebulization 3 milliLiter(s) Nebulizer every 6 hours PRN Shortness of Breath and/or Wheezing  ondansetron Injectable 4 milliGRAM(s) IV Push every 6 hours PRN Nausea and/or Vomiting  oxyCODONE    IR 5 milliGRAM(s) Oral every 6 hours PRN Severe Pain (7 - 10)  polyethylene glycol 3350 17 Gram(s) Oral daily PRN Constipation      Allergies    sulfamethoxazole (Urticaria)    Intolerances        PAST MEDICAL & SURGICAL HISTORY:  Chronic obstructive pulmonary disease (COPD)    DM (diabetes mellitus), type 2    HTN (hypertension)    HLD (hyperlipidemia)    Lung nodules    2019 novel coronavirus disease (COVID-19)    History of hernia repair        FAMILY HISTORY:  Family history of heart disease (Father, Grandparent)        SOCIAL HISTORY  Smoking History:     REVIEW OF SYSTEMS:    CONSTITUTIONAL:  No fevers, chills, sweats    HEENT:  Eyes:  No diplopia or blurred vision. ENT:  No earache, sore throat or runny nose.    CARDIOVASCULAR:  No pressure, squeezing, tightness, or heaviness about the chest; no palpitations.    RESPIRATORY:  see HPI    GASTROINTESTINAL:  No abdominal pain, nausea, vomiting or diarrhea.    GENITOURINARY:  No dysuria, frequency or urgency.    NEUROLOGIC:  No paresthesias, fasciculations, seizures or weakness.    PSYCHIATRIC:  No disorder of thought or mood.    Vital Signs Last 24 Hrs  T(C): 36.4 (08 Mar 2022 10:12), Max: 36.7 (07 Mar 2022 22:50)  T(F): 97.5 (08 Mar 2022 10:12), Max: 98.1 (07 Mar 2022 22:50)  HR: 60 (08 Mar 2022 10:12) (60 - 71)  BP: 137/73 (08 Mar 2022 10:12) (132/75 - 170/69)  BP(mean): --  RR: 20 (08 Mar 2022 10:12) (19 - 20)  SpO2: 94% (08 Mar 2022 10:12) (89% - 97%)    PHYSICAL EXAMINATION:    GENERAL: The patient is_in no apparent distress.     HEENT: Head is normocephalic and atraumatic. Extraocular muscles are intact. Mucous membranes are moist.     NECK: Supple.     LUNGS: moderate air entry bilat with faint rhonchi. Respirations unlabored    HEART: Regular rate and rhythm without murmur.    ABDOMEN: Soft, nontender, and nondistended.  No hepatosplenomegaly is noted.    EXTREMITIES: With 2 plus  edema.    NEUROLOGIC: Grossly intact.      LABS:                        10.4   7.17  )-----------( 168      ( 08 Mar 2022 06:55 )             34.7     03-08    144  |  100  |  29.8<H>  ----------------------------<  124<H>  4.3   |  38.0<H>  |  1.25    Ca    8.9      08 Mar 2022 06:55  Phos  3.3     03-07  Mg     1.5     03-08    TPro  6.8  /  Alb  3.4  /  TBili  1.2  /  DBili  x   /  AST  15  /  ALT  9   /  AlkPhos  76  03-08    PT/INR - ( 06 Mar 2022 12:32 )   PT: 13.9 sec;   INR: 1.20 ratio         PTT - ( 06 Mar 2022 12:32 )  PTT:32.1 sec      CARDIAC MARKERS ( 07 Mar 2022 06:20 )  x     / 0.02 ng/mL / 32 U/L / x     / x      CARDIAC MARKERS ( 06 Mar 2022 17:30 )  x     / 0.02 ng/mL / 37 U/L / x     / x            Serum Pro-Brain Natriuretic Peptide: 3840 pg/mL (03-06-22 @ 11:52)          MICROBIOLOGY:    RADIOLOGY & ADDITIONAL STUDIES:  CXR ,  CT scans, office notes reviewed

## 2022-03-08 NOTE — PHYSICAL THERAPY INITIAL EVALUATION ADULT - DISCHARGE DISPOSITION, PT EVAL
home with assistance and PT vs [facility] pending progress and confirmation of family willingness and ability to provide assist.

## 2022-03-08 NOTE — PROGRESS NOTE ADULT - SUBJECTIVE AND OBJECTIVE BOX
[facility] Division of Hospital Medicine    Chief Complaint:  Scrotal swelling    SUBJECTIVE / OVERNIGHT EVENTS: Patient seen and examined. Still with scrotal swelling and leg swelling. Had to be placed on ventiMask last night for hypoxia.     Patient denies chest pain, abd pain, N/V, fever, chills, dysuria or any other complaints. All remainder ROS negative.     MEDICATIONS  (STANDING):  cholecalciferol 1000 Unit(s) Oral daily  cyanocobalamin 1000 MICROGram(s) Oral daily  dextrose 40% Gel 15 Gram(s) Oral once  dextrose 5%. 1000 milliLiter(s) (50 mL/Hr) IV Continuous <Continuous>  dextrose 5%. 1000 milliLiter(s) (100 mL/Hr) IV Continuous <Continuous>  dextrose 50% Injectable 25 Gram(s) IV Push once  dextrose 50% Injectable 12.5 Gram(s) IV Push once  dextrose 50% Injectable 25 Gram(s) IV Push once  fluticasone furoate/umeclidinium/vilanterol 100-62.5-25 MICROgram(s) Inhaler 1 Puff(s) Inhalation daily  folic acid 1 milliGRAM(s) Oral daily  furosemide   Injectable 40 milliGRAM(s) IV Push every 12 hours  glucagon  Injectable 1 milliGRAM(s) IntraMuscular once  heparin   Injectable 5000 Unit(s) SubCutaneous every 8 hours  hydrALAZINE 25 milliGRAM(s) Oral three times a day  insulin lispro (ADMELOG) corrective regimen sliding scale   SubCutaneous three times a day before meals  insulin lispro (ADMELOG) corrective regimen sliding scale   SubCutaneous at bedtime  isosorbide   mononitrate ER Tablet (IMDUR) 30 milliGRAM(s) Oral daily  levothyroxine 300 MICROGram(s) Oral daily  metoprolol succinate  milliGRAM(s) Oral daily  nystatin Powder 1 Application(s) Topical three times a day  senna 2 Tablet(s) Oral at bedtime  tamsulosin 0.4 milliGRAM(s) Oral at bedtime    MEDICATIONS  (PRN):  acetaminophen     Tablet .. 650 milliGRAM(s) Oral every 6 hours PRN Temp greater or equal to 38C (100.4F), Mild Pain (1 - 3), Moderate Pain (4 - 6)  albuterol/ipratropium for Nebulization 3 milliLiter(s) Nebulizer every 6 hours PRN Shortness of Breath and/or Wheezing  ondansetron Injectable 4 milliGRAM(s) IV Push every 6 hours PRN Nausea and/or Vomiting  oxyCODONE    IR 5 milliGRAM(s) Oral every 6 hours PRN Severe Pain (7 - 10)  polyethylene glycol 3350 17 Gram(s) Oral daily PRN Constipation        I&O's Summary    07 Mar 2022 07:01  -  08 Mar 2022 07:00  --------------------------------------------------------  IN: 0 mL / OUT: 2300 mL / NET: -2300 mL        PHYSICAL EXAM:  Vital Signs Last 24 Hrs  T(C): 36.4 (08 Mar 2022 10:12), Max: 36.7 (07 Mar 2022 22:50)  T(F): 97.5 (08 Mar 2022 10:12), Max: 98.1 (07 Mar 2022 22:50)  HR: 60 (08 Mar 2022 10:12) (60 - 71)  BP: 137/73 (08 Mar 2022 10:12) (132/75 - 170/69)  BP(mean): --  RR: 20 (08 Mar 2022 10:12) (19 - 20)  SpO2: 94% (08 Mar 2022 10:12) (89% - 97%)      CONSTITUTIONAL: NAD, Obese  ENMT: Moist oral mucosa, no pharyngeal injection or exudates;  RESPIRATORY: Normal respiratory effort; lungs are clear to auscultation bilaterally  CARDIOVASCULAR: Regular rate and rhythm, normal S1 and S2,  3+ pitting lower extremity edema;  : + scrotal swelling  ABDOMEN: Nontender to palpation, normoactive bowel sounds, no rebound/guarding  MUSCLOSKELETAL:  no joint swelling or tenderness to palpation  PSYCH: A+O to person, place, and time; affect appropriate  NEUROLOGY: CN 2-12 are intact and symmetric; no gross sensory deficits;   SKIN: bilateral chronic venous insufficiency       LABS:                        10.4   7.17  )-----------( 168      ( 08 Mar 2022 06:55 )             34.7     03-08    144  |  100  |  29.8<H>  ----------------------------<  124<H>  4.3   |  38.0<H>  |  1.25    Ca    8.9      08 Mar 2022 06:55  Phos  3.3     03-07  Mg     1.5     03-08    TPro  6.8  /  Alb  3.4  /  TBili  1.2  /  DBili  x   /  AST  15  /  ALT  9   /  AlkPhos  76  03-08      CARDIAC MARKERS ( 07 Mar 2022 06:20 )  x     / 0.02 ng/mL / 32 U/L / x     / x      CARDIAC MARKERS ( 06 Mar 2022 17:30 )  x     / 0.02 ng/mL / 37 U/L / x     / x              CAPILLARY BLOOD GLUCOSE      POCT Blood Glucose.: 127 mg/dL (08 Mar 2022 11:58)  POCT Blood Glucose.: 130 mg/dL (08 Mar 2022 08:16)  POCT Blood Glucose.: 135 mg/dL (07 Mar 2022 22:44)  POCT Blood Glucose.: 129 mg/dL (07 Mar 2022 16:54)        RADIOLOGY & ADDITIONAL TESTS:  Results Reviewed:   Imaging Personally Reviewed:  Electrocardiogram Personally Reviewed:

## 2022-03-09 LAB
ANION GAP SERPL CALC-SCNC: 6 MMOL/L — SIGNIFICANT CHANGE UP (ref 5–17)
ANION GAP SERPL CALC-SCNC: 9 MMOL/L — SIGNIFICANT CHANGE UP (ref 5–17)
BUN SERPL-MCNC: 26.6 MG/DL — HIGH (ref 8–20)
BUN SERPL-MCNC: 29.3 MG/DL — HIGH (ref 8–20)
CALCIUM SERPL-MCNC: 8.8 MG/DL — SIGNIFICANT CHANGE UP (ref 8.6–10.2)
CALCIUM SERPL-MCNC: 9.2 MG/DL — SIGNIFICANT CHANGE UP (ref 8.6–10.2)
CHLORIDE SERPL-SCNC: 96 MMOL/L — LOW (ref 98–107)
CHLORIDE SERPL-SCNC: 98 MMOL/L — SIGNIFICANT CHANGE UP (ref 98–107)
CO2 SERPL-SCNC: 38 MMOL/L — HIGH (ref 22–29)
CO2 SERPL-SCNC: 39 MMOL/L — HIGH (ref 22–29)
CREAT SERPL-MCNC: 1.13 MG/DL — SIGNIFICANT CHANGE UP (ref 0.5–1.3)
CREAT SERPL-MCNC: 1.24 MG/DL — SIGNIFICANT CHANGE UP (ref 0.5–1.3)
EGFR: 59 ML/MIN/1.73M2 — LOW
EGFR: 66 ML/MIN/1.73M2 — SIGNIFICANT CHANGE UP
GLUCOSE BLDC GLUCOMTR-MCNC: 168 MG/DL — HIGH (ref 70–99)
GLUCOSE BLDC GLUCOMTR-MCNC: 180 MG/DL — HIGH (ref 70–99)
GLUCOSE BLDC GLUCOMTR-MCNC: 190 MG/DL — HIGH (ref 70–99)
GLUCOSE BLDC GLUCOMTR-MCNC: 191 MG/DL — HIGH (ref 70–99)
GLUCOSE BLDC GLUCOMTR-MCNC: 252 MG/DL — HIGH (ref 70–99)
GLUCOSE SERPL-MCNC: 184 MG/DL — HIGH (ref 70–99)
GLUCOSE SERPL-MCNC: 193 MG/DL — HIGH (ref 70–99)
HCT VFR BLD CALC: 35 % — LOW (ref 39–50)
HGB BLD-MCNC: 10.4 G/DL — LOW (ref 13–17)
MAGNESIUM SERPL-MCNC: 1.8 MG/DL — SIGNIFICANT CHANGE UP (ref 1.6–2.6)
MAGNESIUM SERPL-MCNC: 2.1 MG/DL — SIGNIFICANT CHANGE UP (ref 1.6–2.6)
MCHC RBC-ENTMCNC: 29.4 PG — SIGNIFICANT CHANGE UP (ref 27–34)
MCHC RBC-ENTMCNC: 29.7 GM/DL — LOW (ref 32–36)
MCV RBC AUTO: 98.9 FL — SIGNIFICANT CHANGE UP (ref 80–100)
PLATELET # BLD AUTO: 171 K/UL — SIGNIFICANT CHANGE UP (ref 150–400)
POTASSIUM SERPL-MCNC: 4.8 MMOL/L — SIGNIFICANT CHANGE UP (ref 3.5–5.3)
POTASSIUM SERPL-MCNC: 5 MMOL/L — SIGNIFICANT CHANGE UP (ref 3.5–5.3)
POTASSIUM SERPL-SCNC: 4.8 MMOL/L — SIGNIFICANT CHANGE UP (ref 3.5–5.3)
POTASSIUM SERPL-SCNC: 5 MMOL/L — SIGNIFICANT CHANGE UP (ref 3.5–5.3)
RBC # BLD: 3.54 M/UL — LOW (ref 4.2–5.8)
RBC # FLD: 17.2 % — HIGH (ref 10.3–14.5)
SODIUM SERPL-SCNC: 143 MMOL/L — SIGNIFICANT CHANGE UP (ref 135–145)
WBC # BLD: 5.5 K/UL — SIGNIFICANT CHANGE UP (ref 3.8–10.5)
WBC # FLD AUTO: 5.5 K/UL — SIGNIFICANT CHANGE UP (ref 3.8–10.5)

## 2022-03-09 PROCEDURE — 99232 SBSQ HOSP IP/OBS MODERATE 35: CPT

## 2022-03-09 PROCEDURE — 99233 SBSQ HOSP IP/OBS HIGH 50: CPT

## 2022-03-09 RX ORDER — MAGNESIUM SULFATE 500 MG/ML
2 VIAL (ML) INJECTION ONCE
Refills: 0 | Status: COMPLETED | OUTPATIENT
Start: 2022-03-09 | End: 2022-03-09

## 2022-03-09 RX ADMIN — Medication 1: at 12:31

## 2022-03-09 RX ADMIN — ISOSORBIDE MONONITRATE 30 MILLIGRAM(S): 60 TABLET, EXTENDED RELEASE ORAL at 13:32

## 2022-03-09 RX ADMIN — Medication 25 GRAM(S): at 08:59

## 2022-03-09 RX ADMIN — Medication 100 MILLIGRAM(S): at 05:32

## 2022-03-09 RX ADMIN — Medication 3: at 17:21

## 2022-03-09 RX ADMIN — Medication 40 MILLIGRAM(S): at 10:34

## 2022-03-09 RX ADMIN — TAMSULOSIN HYDROCHLORIDE 0.4 MILLIGRAM(S): 0.4 CAPSULE ORAL at 21:48

## 2022-03-09 RX ADMIN — SENNA PLUS 2 TABLET(S): 8.6 TABLET ORAL at 21:50

## 2022-03-09 RX ADMIN — Medication 40 MILLIGRAM(S): at 21:46

## 2022-03-09 RX ADMIN — Medication 40 MILLIGRAM(S): at 17:21

## 2022-03-09 RX ADMIN — POLYETHYLENE GLYCOL 3350 17 GRAM(S): 17 POWDER, FOR SOLUTION ORAL at 11:08

## 2022-03-09 RX ADMIN — Medication 1 MILLIGRAM(S): at 10:34

## 2022-03-09 RX ADMIN — NYSTATIN CREAM 1 APPLICATION(S): 100000 CREAM TOPICAL at 21:46

## 2022-03-09 RX ADMIN — FLUTICASONE FUROATE, UMECLIDINIUM BROMIDE AND VILANTEROL TRIFENATATE 1 PUFF(S): 200; 62.5; 25 POWDER RESPIRATORY (INHALATION) at 10:04

## 2022-03-09 RX ADMIN — NYSTATIN CREAM 1 APPLICATION(S): 100000 CREAM TOPICAL at 13:40

## 2022-03-09 RX ADMIN — Medication 25 MILLIGRAM(S): at 21:50

## 2022-03-09 RX ADMIN — PREGABALIN 1000 MICROGRAM(S): 225 CAPSULE ORAL at 13:32

## 2022-03-09 RX ADMIN — Medication 1000 UNIT(S): at 10:34

## 2022-03-09 RX ADMIN — NYSTATIN CREAM 1 APPLICATION(S): 100000 CREAM TOPICAL at 05:32

## 2022-03-09 RX ADMIN — Medication 25 MILLIGRAM(S): at 13:32

## 2022-03-09 RX ADMIN — Medication 25 MILLIGRAM(S): at 05:32

## 2022-03-09 RX ADMIN — Medication 300 MICROGRAM(S): at 05:32

## 2022-03-09 RX ADMIN — Medication 1: at 08:16

## 2022-03-09 RX ADMIN — HEPARIN SODIUM 5000 UNIT(S): 5000 INJECTION INTRAVENOUS; SUBCUTANEOUS at 05:31

## 2022-03-09 RX ADMIN — Medication 40 MILLIGRAM(S): at 05:32

## 2022-03-09 NOTE — PROGRESS NOTE ADULT - ASSESSMENT
Assess    Acute on chronic hypoxemic, hypercapnic vent failure  CHFpEF- dilated Left atrium consistent  COPD/DIEGO overlap   Patient is currently hospitalized and is in Chronic Respiratory Failure with Hypercapnea scondary to COPD.  Patient was placed on our hospital ventilator non-invasively in BiPAP mode in the past yet ABG results have shown the patient remains hypercapnic despite use of BiPAP.  NIV in Guaranteed Augmented Targeted Tidal Volume modes not available on any BiPAP or BiPAP ST devices will be ordered to minimize the likelihood of further clinical decline or re-hospitalizations.     Rec    Continue diuretics, follow lytes  Trelegy daily, prn neb  short course medrol, prn abx, no fever or leucocytosis  Nocturnal NIV  Incentive spirometry   prognosis guarded, DNR

## 2022-03-09 NOTE — PROGRESS NOTE ADULT - TIME BILLING
Review of notes, orders, labs and images on all accessible EHR platforms  Bedside evaluation  Coordination with all active services, nursing and respiratory

## 2022-03-09 NOTE — PROGRESS NOTE ADULT - SUBJECTIVE AND OBJECTIVE BOX
[facility] Division of Hospital Medicine    Chief Complaint:  Scrotal swelling    SUBJECTIVE / OVERNIGHT EVENTS: Patient seen and examined. Only wore bipap for 2 hours. Still with swelling in the legs and scrotum but improved.     Patient denies chest pain, SOB, abd pain, N/V, fever, chills, dysuria or any other complaints. All remainder ROS negative.     MEDICATIONS  (STANDING):  cholecalciferol 1000 Unit(s) Oral daily  cyanocobalamin 1000 MICROGram(s) Oral daily  dextrose 40% Gel 15 Gram(s) Oral once  dextrose 5%. 1000 milliLiter(s) (50 mL/Hr) IV Continuous <Continuous>  dextrose 5%. 1000 milliLiter(s) (100 mL/Hr) IV Continuous <Continuous>  dextrose 50% Injectable 25 Gram(s) IV Push once  dextrose 50% Injectable 12.5 Gram(s) IV Push once  dextrose 50% Injectable 25 Gram(s) IV Push once  fluticasone furoate/umeclidinium/vilanterol 100-62.5-25 MICROgram(s) Inhaler 1 Puff(s) Inhalation daily  folic acid 1 milliGRAM(s) Oral daily  furosemide   Injectable 40 milliGRAM(s) IV Push every 12 hours  glucagon  Injectable 1 milliGRAM(s) IntraMuscular once  heparin   Injectable 5000 Unit(s) SubCutaneous every 8 hours  hydrALAZINE 25 milliGRAM(s) Oral three times a day  insulin lispro (ADMELOG) corrective regimen sliding scale   SubCutaneous three times a day before meals  insulin lispro (ADMELOG) corrective regimen sliding scale   SubCutaneous at bedtime  isosorbide   mononitrate ER Tablet (IMDUR) 30 milliGRAM(s) Oral daily  levothyroxine 300 MICROGram(s) Oral daily  methylPREDNISolone sodium succinate Injectable 40 milliGRAM(s) IV Push every 12 hours  metoprolol succinate  milliGRAM(s) Oral daily  nystatin Powder 1 Application(s) Topical three times a day  senna 2 Tablet(s) Oral at bedtime  tamsulosin 0.4 milliGRAM(s) Oral at bedtime    MEDICATIONS  (PRN):  acetaminophen     Tablet .. 650 milliGRAM(s) Oral every 6 hours PRN Temp greater or equal to 38C (100.4F), Mild Pain (1 - 3), Moderate Pain (4 - 6)  albuterol/ipratropium for Nebulization 3 milliLiter(s) Nebulizer every 6 hours PRN Shortness of Breath and/or Wheezing  ondansetron Injectable 4 milliGRAM(s) IV Push every 6 hours PRN Nausea and/or Vomiting  oxyCODONE    IR 5 milliGRAM(s) Oral every 6 hours PRN Severe Pain (7 - 10)  polyethylene glycol 3350 17 Gram(s) Oral daily PRN Constipation        I&O's Summary    08 Mar 2022 07:01  -  09 Mar 2022 07:00  --------------------------------------------------------  IN: 480 mL / OUT: 2300 mL / NET: -1820 mL        PHYSICAL EXAM:  Vital Signs Last 24 Hrs  T(C): 36.5 (09 Mar 2022 08:20), Max: 36.8 (08 Mar 2022 21:00)  T(F): 97.7 (09 Mar 2022 08:20), Max: 98.2 (08 Mar 2022 21:00)  HR: 60 (09 Mar 2022 10:30) (56 - 62)  BP: 129/65 (09 Mar 2022 10:30) (126/58 - 157/73)  BP(mean): 94 (08 Mar 2022 15:57) (89 - 94)  RR: 18 (09 Mar 2022 08:20) (18 - 18)  SpO2: 95% (09 Mar 2022 08:20) (92% - 95%)      CONSTITUTIONAL: NAD, Obese  ENMT: Moist oral mucosa, no pharyngeal injection or exudates;  RESPIRATORY: Normal respiratory effort; lungs are clear to auscultation bilaterally  CARDIOVASCULAR: Regular rate and rhythm, normal S1 and S2,  3+ pitting lower extremity edema;  : + scrotal swelling  ABDOMEN: Nontender to palpation, normoactive bowel sounds, no rebound/guarding  MUSCLOSKELETAL:  no joint swelling or tenderness to palpation  PSYCH: A+O to person, place, and time; affect appropriate  NEUROLOGY: CN 2-12 are intact and symmetric; no gross sensory deficits;   SKIN: bilateral chronic venous insufficiency     LABS:                        10.4   5.50  )-----------( 171      ( 09 Mar 2022 06:37 )             35.0     03-09    143  |  96<L>  |  26.6<H>  ----------------------------<  184<H>  4.8   |  38.0<H>  |  1.24    Ca    9.2      09 Mar 2022 06:37  Mg     1.8     03-09    TPro  6.8  /  Alb  3.4  /  TBili  1.2  /  DBili  x   /  AST  15  /  ALT  9   /  AlkPhos  76  03-08              CAPILLARY BLOOD GLUCOSE      POCT Blood Glucose.: 168 mg/dL (09 Mar 2022 07:58)  POCT Blood Glucose.: 213 mg/dL (08 Mar 2022 21:31)  POCT Blood Glucose.: 132 mg/dL (08 Mar 2022 17:17)  POCT Blood Glucose.: 127 mg/dL (08 Mar 2022 11:58)        RADIOLOGY & ADDITIONAL TESTS:  Results Reviewed:   Imaging Personally Reviewed:  Electrocardiogram Personally Reviewed:

## 2022-03-09 NOTE — CHART NOTE - NSCHARTNOTEFT_GEN_A_CORE
Pt had a 2.47 second pause on monitor  Pt without complaints  Asymptomatic  VSS B/P 150/67 P 58 R 19 PO 91% on ventimask T 97.5  MG and BMP  Continue to monitor Pt had a 2.47 second pause on monitor  Pt without complaints  Asymptomatic  VSS B/P 150/67 P 58 R 19 PO 91% on ventimask T 97.5  MG and BMP  MG 2.1 K 5.0  Continue to monitor

## 2022-03-09 NOTE — PROGRESS NOTE ADULT - SUBJECTIVE AND OBJECTIVE BOX
[facility], THE HEART [facility]ENTER                              [address] [address]                                                 PHONE: [phone]                                                 FAX: [phone]  -----------------------------------------------------------------------------------------------  Pt seen and examined. FU for  shortness of breath      Overnight events/Complaints:     Pt feels no improvement in the last 48 hours,. Still diuresing well. Abdominal distension improving.    Vital Signs Last 24 Hrs  T(C): 36.4 (08 Mar 2022 10:12), Max: 36.7 (07 Mar 2022 22:50)  T(F): 97.5 (08 Mar 2022 10:12), Max: 98.1 (07 Mar 2022 22:50)  HR: 60 (08 Mar 2022 10:12) (60 - 71)  BP: 137/73 (08 Mar 2022 10:12) (137/73 - 170/69)  BP(mean): --  RR: 20 (08 Mar 2022 10:12) (19 - 20)  SpO2: 94% (08 Mar 2022 10:12) (89% - 97%)  I&O's Summary    07 Mar 2022 07:01  -  08 Mar 2022 07:00  --------------------------------------------------------  IN: 0 mL / OUT: 2300 mL / NET: -2300 mL        PHYSICAL EXAM:  Constitutional: Appears well; alert and co-operative  HEENT:     Head: Normocephalic and atraumatic  Neck: supple. No JVD  Cardiovascular: regular S1 S2  Respiratory: Lungs clear to auscultation; no crepitations, no wheeze  Gastrointestinal:  Soft, Non-tender, + BS	  Musculoskeletal: Normal range of motion. +++ edema  Skin: Warm and dry. No cyanosis . No diaphoresis.  Neurologic: Alert oriented to time place and person. Normal strength and no tremor.        LABS:                        10.4   7.17  )-----------( 168      ( 08 Mar 2022 06:55 )             34.7     03-08    144  |  100  |  29.8<H>  ----------------------------<  124<H>  4.3   |  38.0<H>  |  1.25    Ca    8.9      08 Mar 2022 06:55  Phos  3.3     03-07  Mg     1.5     03-08    TPro  6.8  /  Alb  3.4  /  TBili  1.2  /  DBili  x   /  AST  15  /  ALT  9   /  AlkPhos  76  03-08    CARDIAC MARKERS ( 07 Mar 2022 06:20 )  x     / 0.02 ng/mL / 32 U/L / x     / x      CARDIAC MARKERS ( 06 Mar 2022 17:30 )  x     / 0.02 ng/mL / 37 U/L / x     / x      CARDIAC MARKERS ( 06 Mar 2022 11:52 )  x     / 0.03 ng/mL / x     / x     / x          PT/INR - ( 06 Mar 2022 12:32 )   PT: 13.9 sec;   INR: 1.20 ratio         PTT - ( 06 Mar 2022 12:32 )  PTT:32.1 sec    RADIOLOGY & ADDITIONAL STUDIES: (reviewed)  CXR was independently visualized/reviewed  and demonstrated: congestion    CARDIOLOGY TESTING:(reviewed)     ECHOCARDIOGRAM independently visualized/reviewed and demonstrated :   Summary:   1. Left ventricular ejection fraction, by visual estimation, is 50 to   55%.   2. Normal global left ventricular systolic function.   3. Moderatelyincreased LV wall thickness.   4. Moderately enlarged right ventricle. Mildly reduced RV systolic   function.   5. Right ventricular volume overload.   6. Moderate biatrial enlargement.   7. Moderate tricuspid regurgitation.   8. Estimated pulmonary artery systolic pressure is 52.2 mmHg assuming a   right atrial pressure of 15 mmHg, which is consistent with moderate   pulmonary hypertension.   9. Thickening of the anterior and posterior mitral valve leaflets.  10. Mild mitral annular calcification.  11. Trace mitral valve regurgitation.  12. Dilatation of the ascending aorta, measuring approx 3.67 cm.  13. Sclerotic aortic valve with normal opening.  14. There is no evidence of pericardial effusion.  15. Endocardial visualization was enhancedwith intravenous echo contrast.  16. There are no prior studies on this patient for comparison purposes.    Gayla Ramirez MD Electronically signed on 3/7/2022 at 12:38:54 PM    MEDICATIONS:(reviewed)  MEDICATIONS  (STANDING):  cholecalciferol 1000 Unit(s) Oral daily  cyanocobalamin 1000 MICROGram(s) Oral daily  dextrose 40% Gel 15 Gram(s) Oral once  dextrose 5%. 1000 milliLiter(s) (50 mL/Hr) IV Continuous <Continuous>  dextrose 5%. 1000 milliLiter(s) (100 mL/Hr) IV Continuous <Continuous>  dextrose 50% Injectable 25 Gram(s) IV Push once  dextrose 50% Injectable 12.5 Gram(s) IV Push once  dextrose 50% Injectable 25 Gram(s) IV Push once  fluticasone furoate/umeclidinium/vilanterol 100-62.5-25 MICROgram(s) Inhaler 1 Puff(s) Inhalation daily  folic acid 1 milliGRAM(s) Oral daily  furosemide   Injectable 40 milliGRAM(s) IV Push every 12 hours  glucagon  Injectable 1 milliGRAM(s) IntraMuscular once  heparin   Injectable 5000 Unit(s) SubCutaneous every 8 hours  hydrALAZINE 25 milliGRAM(s) Oral three times a day  insulin lispro (ADMELOG) corrective regimen sliding scale   SubCutaneous three times a day before meals  insulin lispro (ADMELOG) corrective regimen sliding scale   SubCutaneous at bedtime  levothyroxine 300 MICROGram(s) Oral daily  magnesium sulfate  IVPB 2 Gram(s) IV Intermittent every 4 hours  metoprolol succinate  milliGRAM(s) Oral daily  nystatin Powder 1 Application(s) Topical three times a day  senna 2 Tablet(s) Oral at bedtime  tamsulosin 0.4 milliGRAM(s) Oral at bedtime      ASSESSMENT AND PLAN:    81y Male with prior history of morbidly obese former smoker man with COPD, COVID 19 c/b respiratory failure with prolonged hospitalization, discharge on home O2 and now with chronic respiratory failure requiring 4L O2, hypertension, suspected lymphedema, who has had increasingly sedentary lifestyle since his last hospitalization who presents with dyspnea, increased edema and scrotal edema, found to have acute on chronic hypoxic respiratory failure     acute on chronic hypoxic respiratory failure/hypervolemia/NSVT    - Tele  - continue lasix 40mg IV BID  - continue imdur 30mg with hydralazine 25mg TID for further afterload reduction  - continue metoprolol   - strict I/O and daily weight    - Pulmonary FUP  - TTE with moderate pHTN  - renal function improving  - eventual LHC/RHC prior to DC likely Monday

## 2022-03-09 NOTE — PROGRESS NOTE ADULT - SUBJECTIVE AND OBJECTIVE BOX
PULMONARY PROGRESS NOTE      HILDA VELOZGEO-[id number]    Patient is a 81y old  Male who presents with a chief complaint of Scrotal swelling (09 Mar 2022 09:52)  MO  DIEGO  COPD  DNR    INTERVAL HPI/OVERNIGHT EVENTS:    Comfortable   Tolerated AVAPs 4-5 hours last night     MEDICATIONS  (STANDING):  cholecalciferol 1000 Unit(s) Oral daily  cyanocobalamin 1000 MICROGram(s) Oral daily  dextrose 40% Gel 15 Gram(s) Oral once  dextrose 5%. 1000 milliLiter(s) (50 mL/Hr) IV Continuous <Continuous>  dextrose 5%. 1000 milliLiter(s) (100 mL/Hr) IV Continuous <Continuous>  dextrose 50% Injectable 25 Gram(s) IV Push once  dextrose 50% Injectable 12.5 Gram(s) IV Push once  dextrose 50% Injectable 25 Gram(s) IV Push once  fluticasone furoate/umeclidinium/vilanterol 100-62.5-25 MICROgram(s) Inhaler 1 Puff(s) Inhalation daily  folic acid 1 milliGRAM(s) Oral daily  furosemide   Injectable 40 milliGRAM(s) IV Push every 12 hours  glucagon  Injectable 1 milliGRAM(s) IntraMuscular once  heparin   Injectable 5000 Unit(s) SubCutaneous every 8 hours  hydrALAZINE 25 milliGRAM(s) Oral three times a day  insulin lispro (ADMELOG) corrective regimen sliding scale   SubCutaneous three times a day before meals  insulin lispro (ADMELOG) corrective regimen sliding scale   SubCutaneous at bedtime  isosorbide   mononitrate ER Tablet (IMDUR) 30 milliGRAM(s) Oral daily  levothyroxine 300 MICROGram(s) Oral daily  methylPREDNISolone sodium succinate Injectable 40 milliGRAM(s) IV Push every 12 hours  metoprolol succinate  milliGRAM(s) Oral daily  nystatin Powder 1 Application(s) Topical three times a day  senna 2 Tablet(s) Oral at bedtime  tamsulosin 0.4 milliGRAM(s) Oral at bedtime      MEDICATIONS  (PRN):  acetaminophen     Tablet .. 650 milliGRAM(s) Oral every 6 hours PRN Temp greater or equal to 38C (100.4F), Mild Pain (1 - 3), Moderate Pain (4 - 6)  albuterol/ipratropium for Nebulization 3 milliLiter(s) Nebulizer every 6 hours PRN Shortness of Breath and/or Wheezing  ondansetron Injectable 4 milliGRAM(s) IV Push every 6 hours PRN Nausea and/or Vomiting  oxyCODONE    IR 5 milliGRAM(s) Oral every 6 hours PRN Severe Pain (7 - 10)  polyethylene glycol 3350 17 Gram(s) Oral daily PRN Constipation      Allergies    sulfamethoxazole (Urticaria)    Intolerances        PAST MEDICAL & SURGICAL HISTORY:  Chronic obstructive pulmonary disease (COPD)    DM (diabetes mellitus), type 2    HTN (hypertension)    HLD (hyperlipidemia)    Lung nodules    2019 novel coronavirus disease (COVID-19)    History of hernia repair        SOCIAL HISTORY  Smoking History:       REVIEW OF SYSTEMS:    CONSTITUTIONAL:  No distress    HEENT:  Eyes:  No diplopia or blurred vision. ENT:  No earache, sore throat or runny nose.    CARDIOVASCULAR:  No pressure, squeezing, tightness, heaviness or aching about the chest; no palpitations.    RESPIRATORY:  No cough, shortness of breath, PND or orthopnea. Mild SOBOE    GASTROINTESTINAL:  No nausea, vomiting or diarrhea.    GENITOURINARY:  No dysuria, frequency or urgency.    NEUROLOGIC:  No paresthesias, fasciculations, seizures or weakness.    PSYCHIATRIC:  No disorder of thought or mood.    Vital Signs Last 24 Hrs  T(C): 36.5 (09 Mar 2022 08:20), Max: 36.8 (08 Mar 2022 21:00)  T(F): 97.7 (09 Mar 2022 08:20), Max: 98.2 (08 Mar 2022 21:00)  HR: 56 (09 Mar 2022 08:20) (56 - 62)  BP: 126/58 (09 Mar 2022 08:20) (126/58 - 157/73)  BP(mean): 94 (08 Mar 2022 15:57) (89 - 94)  RR: 18 (09 Mar 2022 08:20) (18 - 18)  SpO2: 95% (09 Mar 2022 08:20) (92% - 95%)    PHYSICAL EXAMINATION:    GENERAL: The patient is awake and alert in no apparent distress.     HEENT: Head is normocephalic and atraumatic. Extraocular muscles are intact. Mucous membranes are moist.    NECK: Supple.    LUNGS: Clear to auscultation without wheezing, rales or rhonchi; respirations unlabored    HEART: Regular rate and rhythm without murmur.    ABDOMEN: Soft, nontender, and nondistended.      EXTREMITIES: Without any cyanosis, clubbing, rash, lesions or edema.    NEUROLOGIC: Grossly intact.    LABS:                        10.4   5.50  )-----------( 171      ( 09 Mar 2022 06:37 )             35.0     03-09    143  |  96<L>  |  26.6<H>  ----------------------------<  184<H>  4.8   |  38.0<H>  |  1.24    Ca    9.2      09 Mar 2022 06:37  Mg     1.8     03-09    TPro  6.8  /  Alb  3.4  /  TBili  1.2  /  DBili  x   /  AST  15  /  ALT  9   /  AlkPhos  76  03-08      Serum Pro-Brain Natriuretic Peptide: 3840 pg/mL (03-06-22 @ 11:52)      RADIOLOGY & ADDITIONAL STUDIES:    CXR on 3/6/22  Small bilat effusions  CM vs poor insp    Duplex LE 3/8/22  Neg    Echo 3/7/22   1. Left ventricular ejection fraction, by visual estimation, is 50 to   55%.   2. Normal global left ventricular systolic function.   3. Moderately increased LV wall thickness.   4. Moderately enlarged right ventricle. Mildly reduced RV systolic   function.   5. Right ventricular volume overload.   6. Moderate biatrial enlargement.   7. Moderate tricuspid regurgitation.   8. Estimated pulmonary artery systolic pressure is 52.2 mmHg assuming a   right atrial pressure of 15 mmHg, which is consistent with moderate   pulmonary hypertension.   9. Thickening of the anterior and posterior mitral valve leaflets.  10. Mild mitral annular calcification.  11. Trace mitral valve regurgitation.  12. Dilatation of the ascending aorta, measuring approx 3.67 cm.  13. Sclerotic aortic valve with normal opening.  14. There is no evidence of pericardial effusion.  15. Endocardial visualization was enhancedwith intravenous echo contrast.  16. There are no prior studies on this patient for comparison purposes.    Gayla Ramirez MD Electronically signed on 3/7/2022 at 12:38:54 PM

## 2022-03-09 NOTE — CHART NOTE - NSCHARTNOTEFT_GEN_A_CORE
Pt on 6 L NC at home  Pt on 6 L NC and nocturnal BIPAP in hospital   Pt requesting mask instead of NC  40% Ventimask ordered as equivalent to 6 L NC  PO 93%  Continue to monitor Pt on 6 L NC at home  Pt on 6 L NC and nocturnal BIPAP in hospital   Pt requesting mask instead of NC  NAD VSS  40% Ventimask ordered as equivalent to 6 L NC  PO 93%  Continue to monitor

## 2022-03-09 NOTE — CHART NOTE - NSCHARTNOTEFT_GEN_A_CORE
Pt has cesar  hematuria in cesar bag  Pt states not new. He has a"Kidney lesion"  As per chart  4.6 cm left upper pole renal lesion suspicious for renal cell carcinoma.   Pt NAD VSS pt without complaints  Urine draining well  No clots visualized  H/H ordered  Continue to monitor

## 2022-03-09 NOTE — PROGRESS NOTE ADULT - ASSESSMENT
81 year old male with history of Lymphedema, Morbid Obesity, COPD on 4LNC, Smoking (stopped 3 years ago), Lung Nodules, HTN, Hypothyroidism, RA and COVID-19 brought by daughter with hypoxia and scrotal swelling. In ER, he was found to be hypoxic to 88%, supplemental oxygen was increased to 5LNC. He was noted to have anasarca and was given a dose of Lasix 40 mg x 1.     Anasarca/ Acute on chronic HFpEF (EF 50-55%)/Moderate Pulmonary Hypertension, unchanged  - Strict intake/output and daily weight  - Lasix 40 mg IVP q 12 hours  - Hydralazine 25mg TID  - Imdur 30mg daily  - [facility] following, will need RHD/LHC when volume status improved.     Right Pleural Effusion   - Lasix as above  - Consider thoracocentesis if no improvement     Chronic respiratory failure due to  COPD  on 4L NC at baseline  - Symbicort and Spiriva (takes Trelegy at home)  - DuoNeb PRN  - Pulmonary consulted, NIPPV at night.   - O2 sats 88% and above acceptable     Scrotal Swelling  - Likely due to above  - Scrotal US wnl  - Continue Lasix    Left Renal Lesion (suspected RCC)   - Dr. Marley Discussed with patient, he denies any history in past   - Needs further work up by Urology     Renal Insufficiency possible CKD, stable  - Unknowns baseline renal function   - Avoid nephrotoxic medications  - Monitor renal function     BPH  - Shukla's catheter was placed in ER  - TOV in 1-2 days  - Flomax     Hypomagnesemia  - replaced     HTN  - Continue Metoprolol    DM 2  - HbA1c 6.9%  - Accu checks and ISS    Hypothyroidism  - Continue Synthroid     Rheumatoid Arthritis  - Gets weekly Methotrexate (on Friday)  - Will avoid NSAIDs due to renal insufficiency  - Oxycodone PRN    DVT Prophylaxis -- Heparin SQ    Advance Directives: DNR/BRENNON CABALLERO in alpha  Dispo: Remain inpt for IV diuresis. PT ordered.

## 2022-03-10 LAB
ANION GAP SERPL CALC-SCNC: 5 MMOL/L — SIGNIFICANT CHANGE UP (ref 5–17)
BASE EXCESS BLDA CALC-SCNC: 23.5 MMOL/L — HIGH (ref -2–3)
BLOOD GAS COMMENTS ARTERIAL: SIGNIFICANT CHANGE UP
BUN SERPL-MCNC: 32.1 MG/DL — HIGH (ref 8–20)
CALCIUM SERPL-MCNC: 9.4 MG/DL — SIGNIFICANT CHANGE UP (ref 8.6–10.2)
CHLORIDE SERPL-SCNC: 97 MMOL/L — LOW (ref 98–107)
CO2 SERPL-SCNC: 40 MMOL/L — HIGH (ref 22–29)
CREAT SERPL-MCNC: 1.19 MG/DL — SIGNIFICANT CHANGE UP (ref 0.5–1.3)
EGFR: 62 ML/MIN/1.73M2 — SIGNIFICANT CHANGE UP
GAS PNL BLDA: SIGNIFICANT CHANGE UP
GLUCOSE BLDC GLUCOMTR-MCNC: 153 MG/DL — HIGH (ref 70–99)
GLUCOSE BLDC GLUCOMTR-MCNC: 157 MG/DL — HIGH (ref 70–99)
GLUCOSE BLDC GLUCOMTR-MCNC: 159 MG/DL — HIGH (ref 70–99)
GLUCOSE BLDC GLUCOMTR-MCNC: 164 MG/DL — HIGH (ref 70–99)
GLUCOSE SERPL-MCNC: 162 MG/DL — HIGH (ref 70–99)
HCO3 BLDA-SCNC: 48 MMOL/L — CRITICAL HIGH (ref 21–28)
HCT VFR BLD CALC: 35.2 % — LOW (ref 39–50)
HCT VFR BLD CALC: 36.1 % — LOW (ref 39–50)
HGB BLD-MCNC: 10.5 G/DL — LOW (ref 13–17)
HGB BLD-MCNC: 10.7 G/DL — LOW (ref 13–17)
MAGNESIUM SERPL-MCNC: 2.1 MG/DL — SIGNIFICANT CHANGE UP (ref 1.6–2.6)
MCHC RBC-ENTMCNC: 29.6 GM/DL — LOW (ref 32–36)
MCHC RBC-ENTMCNC: 29.6 PG — SIGNIFICANT CHANGE UP (ref 27–34)
MCHC RBC-ENTMCNC: 29.8 GM/DL — LOW (ref 32–36)
MCHC RBC-ENTMCNC: 29.9 PG — SIGNIFICANT CHANGE UP (ref 27–34)
MCV RBC AUTO: 100 FL — SIGNIFICANT CHANGE UP (ref 80–100)
MCV RBC AUTO: 100.3 FL — HIGH (ref 80–100)
PCO2 BLDA: 78 MMHG — CRITICAL HIGH (ref 35–48)
PH BLDA: 7.4 — SIGNIFICANT CHANGE UP (ref 7.35–7.45)
PLATELET # BLD AUTO: 166 K/UL — SIGNIFICANT CHANGE UP (ref 150–400)
PLATELET # BLD AUTO: 168 K/UL — SIGNIFICANT CHANGE UP (ref 150–400)
PO2 BLDA: 112 MMHG — HIGH (ref 83–108)
POTASSIUM SERPL-MCNC: 5.2 MMOL/L — SIGNIFICANT CHANGE UP (ref 3.5–5.3)
POTASSIUM SERPL-SCNC: 5.2 MMOL/L — SIGNIFICANT CHANGE UP (ref 3.5–5.3)
RBC # BLD: 3.51 M/UL — LOW (ref 4.2–5.8)
RBC # BLD: 3.61 M/UL — LOW (ref 4.2–5.8)
RBC # FLD: 16.9 % — HIGH (ref 10.3–14.5)
RBC # FLD: 17.2 % — HIGH (ref 10.3–14.5)
SAO2 % BLDA: 97.8 % — SIGNIFICANT CHANGE UP (ref 94–98)
SODIUM SERPL-SCNC: 142 MMOL/L — SIGNIFICANT CHANGE UP (ref 135–145)
WBC # BLD: 5.61 K/UL — SIGNIFICANT CHANGE UP (ref 3.8–10.5)
WBC # BLD: 6.1 K/UL — SIGNIFICANT CHANGE UP (ref 3.8–10.5)
WBC # FLD AUTO: 5.61 K/UL — SIGNIFICANT CHANGE UP (ref 3.8–10.5)
WBC # FLD AUTO: 6.1 K/UL — SIGNIFICANT CHANGE UP (ref 3.8–10.5)

## 2022-03-10 PROCEDURE — 99233 SBSQ HOSP IP/OBS HIGH 50: CPT

## 2022-03-10 PROCEDURE — 99232 SBSQ HOSP IP/OBS MODERATE 35: CPT

## 2022-03-10 RX ORDER — FUROSEMIDE 40 MG
60 TABLET ORAL EVERY 12 HOURS
Refills: 0 | Status: DISCONTINUED | OUTPATIENT
Start: 2022-03-10 | End: 2022-03-13

## 2022-03-10 RX ORDER — METOPROLOL TARTRATE 50 MG
25 TABLET ORAL EVERY 8 HOURS
Refills: 0 | Status: DISCONTINUED | OUTPATIENT
Start: 2022-03-10 | End: 2022-03-13

## 2022-03-10 RX ADMIN — TAMSULOSIN HYDROCHLORIDE 0.4 MILLIGRAM(S): 0.4 CAPSULE ORAL at 21:52

## 2022-03-10 RX ADMIN — Medication 60 MILLIGRAM(S): at 17:51

## 2022-03-10 RX ADMIN — Medication 1: at 08:35

## 2022-03-10 RX ADMIN — SENNA PLUS 2 TABLET(S): 8.6 TABLET ORAL at 21:52

## 2022-03-10 RX ADMIN — Medication 1: at 17:15

## 2022-03-10 RX ADMIN — NYSTATIN CREAM 1 APPLICATION(S): 100000 CREAM TOPICAL at 07:11

## 2022-03-10 RX ADMIN — Medication 40 MILLIGRAM(S): at 07:10

## 2022-03-10 RX ADMIN — Medication 1: at 12:07

## 2022-03-10 RX ADMIN — Medication 25 MILLIGRAM(S): at 21:52

## 2022-03-10 RX ADMIN — Medication 25 MILLIGRAM(S): at 07:11

## 2022-03-10 RX ADMIN — Medication 300 MICROGRAM(S): at 07:10

## 2022-03-10 NOTE — PROGRESS NOTE ADULT - SUBJECTIVE AND OBJECTIVE BOX
[facility], [facility]                                   [address] [place] [address]                                                      PHONE: [phone]                                                         FAX: [phone]  [email or web address]  ---------------------------------------------------------------------------------------------------------------------------------    Overnight events/patient complaints: lethargic and on biPAP     INTERPRETATION OF TELEMETRY (personally reviewed): reviewed     < from: TTE Echo Complete w/o Contrast w/ Doppler (03.07.22 @ 11:33) >   1. Left ventricular ejection fraction, by visual estimation, is 50 to 55%.   2. Normal global left ventricular systolic function.   3. Moderately increased LV wall thickness.   4. Moderately enlarged right ventricle. Mildly reduced RV systolic function.   5. Right ventricular volume overload.   6. Moderate biatrial enlargement.   7. Moderate tricuspid regurgitation.   8. Estimated pulmonary artery systolic pressure is 52.2 mmHg assuming a right atrial pressure of 15 mmHg, which is consistent with moderate pulmonary hypertension.   9. Thickening of the anterior and posterior mitral valve leaflets.  10. Mild mitral annular calcification.  11. Trace mitral valve regurgitation.  12. Dilatation of the ascending aorta, measuring approx 3.67 cm.  13. Sclerotic aortic valve with normal opening.  14. There is no evidence of pericardial effusion.  15. Endocardial visualization was enhanced with intravenous echo contrast.  16. There are no prior studies on this patient for comparison purposes.    I&O's Summary    09 Mar 2022 07:01  -  10 Mar 2022 07:00  --------------------------------------------------------  IN: 540 mL / OUT: 950 mL / NET: -410 mL    10 Mar 2022 07:01  -  10 Mar 2022 10:09  --------------------------------------------------------  IN: 0 mL / OUT: 1200 mL / NET: -1200 mL      PAST MEDICAL & SURGICAL HISTORY:  Chronic obstructive pulmonary disease (COPD)  DM (diabetes mellitus), type 2  HTN (hypertension)  HLD (hyperlipidemia)  Lung nodules  2019 novel coronavirus disease (COVID-19  History of hernia repair    Allergies   sulfamethoxazole (Urticaria)    MEDICATIONS  (STANDING):  cholecalciferol 1000 Unit(s) Oral daily  cyanocobalamin 1000 MICROGram(s) Oral daily  dextrose 40% Gel 15 Gram(s) Oral once  dextrose 5%. 1000 milliLiter(s) (50 mL/Hr) IV Continuous <Continuous>  dextrose 5%. 1000 milliLiter(s) (100 mL/Hr) IV Continuous <Continuous>  dextrose 50% Injectable 25 Gram(s) IV Push once  dextrose 50% Injectable 12.5 Gram(s) IV Push once  dextrose 50% Injectable 25 Gram(s) IV Push once  fluticasone furoate/umeclidinium/vilanterol 100-62.5-25 MICROgram(s) Inhaler 1 Puff(s) Inhalation daily  folic acid 1 milliGRAM(s) Oral daily  furosemide   Injectable 40 milliGRAM(s) IV Push every 12 hours  glucagon  Injectable 1 milliGRAM(s) IntraMuscular once  hydrALAZINE 25 milliGRAM(s) Oral three times a day  insulin lispro (ADMELOG) corrective regimen sliding scale   SubCutaneous three times a day before meals  insulin lispro (ADMELOG) corrective regimen sliding scale   SubCutaneous at bedtime  isosorbide   mononitrate ER Tablet (IMDUR) 30 milliGRAM(s) Oral daily  levothyroxine 300 MICROGram(s) Oral daily  metoprolol tartrate 25 milliGRAM(s) Oral every 8 hours  nystatin Powder 1 Application(s) Topical three times a day  senna 2 Tablet(s) Oral at bedtime  tamsulosin 0.4 milliGRAM(s) Oral at bedtime    MEDICATIONS  (PRN):  acetaminophen     Tablet .. 650 milliGRAM(s) Oral every 6 hours PRN Temp greater or equal to 38C (100.4F), Mild Pain (1 - 3), Moderate Pain (4 - 6)  albuterol/ipratropium for Nebulization 3 milliLiter(s) Nebulizer every 6 hours PRN Shortness of Breath and/or Wheezing  ondansetron Injectable 4 milliGRAM(s) IV Push every 6 hours PRN Nausea and/or Vomiting  oxyCODONE    IR 5 milliGRAM(s) Oral every 6 hours PRN Severe Pain (7 - 10)  polyethylene glycol 3350 17 Gram(s) Oral daily PRN Constipation      Vital Signs Last 24 Hrs  T(C): 36.3 (10 Mar 2022 09:20), Max: 36.7 (09 Mar 2022 21:30)  T(F): 97.4 (10 Mar 2022 09:20), Max: 98.1 (09 Mar 2022 21:30)  HR: 52 (10 Mar 2022 09:20) (49 - 64)  BP: 146/69 (10 Mar 2022 09:20) (129/65 - 156/78)  BP(mean): 93 (09 Mar 2022 15:35) (93 - 93)  RR: 18 (10 Mar 2022 09:20) (18 - 19)  SpO2: 96% (10 Mar 2022 09:20) (91% - 98%)  ICU Vital Signs Last 24 Hrs    PHYSICAL EXAM:  General: morbidly obese, lethargy   HEENT: Head; normocephalic, atraumatic.Pupils reactive, cornea wnl. BiPAP   CARDIOVASCULAR: Normal S1 and S2, 1/6 STACIA, no rub, gallop or lift.   LUNGS: No rales, rhonchi or wheeze. Normal breath sounds bilaterally.  ABDOMEN: Soft, nontender without mass or organomegaly. Bowel sounds normoactive.  EXTREMITIES: No clubbing, cyanosis, (+) edema.   SKIN: warm and dry with normal turgor.  NEURO: Alert/oriented x1/lethargic   PSYCH: lethargic         LABS:                        10.7   6.10  )-----------( 168      ( 10 Mar 2022 06:37 )             36.1     03-10    142  |  97<L>  |  32.1<H>  ----------------------------<  162<H>  5.2   |  40.0<H>  |  1.19    Ca    9.4      10 Mar 2022 06:37  Mg     2.1     03-10    ASSESSMENT AND PLAN:   Patient is a morbidly obese former smoker man with COPD, COVID 19 c/b respiratory failure with prolonged hospitalization, discharge on home O2 and now with chronic respiratory failure requiring 4L O2, hypertension, suspected lymphedema, who has had increasingly sedentary lifestyle since his last hospitalization who presents with dyspnea, increased edema and scrotal edema, found to have acute on chronic hypoxic respiratory failure     acute on chronic hypoxic respiratory failure/hypervolemia/NSVT  - continue BiPAP support, repeat blood gas   - increase lasix 60mg IV   - continue imdur with hydralazine 25mg TID for further afterload reduction  - continue metoprolol   - strict I/O and daily weight    - TTE reviewed   - daily renal indices, Cr noted, avoid nephrotoxins  - eventual LHC/RHC  - may benefit from external compression of the bilateral LE   - tele      Thank you for allowing [facility] to participate in the care of this patient.  Please feel free to call with any questions or concerns.

## 2022-03-10 NOTE — PATIENT PROFILE ADULT - FALL HARM RISK - RISK INTERVENTIONS

## 2022-03-10 NOTE — PROGRESS NOTE ADULT - ASSESSMENT
Assess    Acute on chronic hypoxemic, hypercapnic vent failure  CHFpEF- dilated Left atrium consistent  COPD/DIEGO overlap   Prognosis guarded, DNR  Patient is currently hospitalized and is in Chronic Respiratory Failure with Hypercapnea scondary to COPD.  Patient was placed on our hospital ventilator non-invasively in BiPAP mode in the past yet ABG results have shown the patient remains hypercapnic despite use of BiPAP.  NIV in Guaranteed Augmented Targeted Tidal Volume modes not available on any BiPAP or BiPAP ST devices will be ordered to minimize the likelihood of further clinical decline or re-hospitalizations.     Rec    Continue diuretics, follow lytes  LABA/ICS/LAMA (Trelegy as OP) daily, prn neb  short course medrol, prn abx, no fever or leucocytosis  Nocturnal NIV  Incentive spirometry

## 2022-03-10 NOTE — PROGRESS NOTE ADULT - ASSESSMENT
81 year old male with history of Lymphedema, Morbid Obesity, COPD on 4LNC, Smoking (stopped 3 years ago), Lung Nodules, HTN, Hypothyroidism, RA and COVID-19 brought by daughter with hypoxia and scrotal swelling. In ER, he was found to be hypoxic to 88%, supplemental oxygen was increased to 5LNC. He was noted to have anasarca and was given a dose of Lasix 40 mg x 1.     Anasarca/ Acute on chronic HFpEF (EF 50-55%)/Moderate Pulmonary Hypertension, unchanged  - Strict intake/output and daily weight  - Increase Lasix 60 mg IVP q 12 hours  - Hydralazine 25mg TID  - Imdur 30mg daily  - [facility] Cardiology following, will need RHC/LHC when volume status improved.     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 Marley Discussed with patient, he denies any history in past   - Needs further work up by Urology   - hold heparin    Renal Insufficiency possible CKD, stable  - Unknowns baseline renal function   - Avoid nephrotoxic medications  - Monitor renal function     BPH  - Shukla's catheter was placed in ER  - TOV in 1-2 days  - Flomax     Hypomagnesemia  - replaced     HTN  - Continue Metoprolol    DM 2  - HbA1c 6.9%  - Accu checks and ISS    Hypothyroidism  - Continue Synthroid     Rheumatoid Arthritis  - Gets weekly Methotrexate (on Friday)  - Will avoid NSAIDs due to renal insufficiency  - Oxycodone PRN    DVT Prophylaxis -- Heparin SQ  DIspo: patient is critically ill requiring step down level of care. No plans for dc.     Advance Directives: DNR/I. MOLST in alpha  Dispo: Remain inpt for IV diuresis. PT ordered. 81 year old male with history of Lymphedema, Morbid Obesity, COPD on 4LNC, Smoking (stopped 3 years ago), Lung Nodules, HTN, Hypothyroidism, RA and COVID-19 brought by daughter with hypoxia and scrotal swelling. In ER, he was found to be hypoxic to 88%, supplemental oxygen was increased to 5LNC. He was noted to have anasarca and was given a dose of Lasix 40 mg x 1.     Anasarca/ Acute on chronic HFpEF (EF 50-55%)/Moderate Pulmonary Hypertension, unchanged  - Strict intake/output and daily weight  - Increase Lasix 60 mg IVP q 12 hours  - Hydralazine 25mg TID  - Imdur 30mg daily  - [facility] Cardiology following, will need RHC/LHC when volume status improved.     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 Marley Discussed with patient, he denies any history in past   - Needs further work up by Urology   - hold heparin    Renal Insufficiency possible CKD, stable  - Unknowns baseline renal function   - Avoid nephrotoxic medications  - Monitor renal function     BPH  - Shukla's catheter was placed in ER  - TOV in 1-2 days  - Flomax     Hypomagnesemia  - replaced     HTN  - Continue Metoprolol    DM 2  - HbA1c 6.9%  - Accu checks and ISS    Hypothyroidism  - Continue Synthroid     Rheumatoid Arthritis  - Gets weekly Methotrexate (on Friday)  - Will avoid NSAIDs due to renal insufficiency  - Oxycodone PRN    DVT Prophylaxis -- Heparin SQ  DIspo: patient is critically ill requiring step down level of care. No plans for dc.   Advance Directives: DNR/BRENNON CABALLERO in alpha  Son-in-Law Mateo updated over the phone.

## 2022-03-10 NOTE — PROGRESS NOTE ADULT - SUBJECTIVE AND OBJECTIVE BOX
[facility] Division of Hospital Medicine    Chief Complaint:  scrotal swelling    SUBJECTIVE / OVERNIGHT EVENTS: He refused AVAPs overnight but was placed on AVAPS this AM.  Patient seen and examined. He is lethargic and difficult to arouse. STAT ABG Obtained.     Unable to obtain ROS due to mental statu    MEDICATIONS  (STANDING):  cholecalciferol 1000 Unit(s) Oral daily  cyanocobalamin 1000 MICROGram(s) Oral daily  dextrose 40% Gel 15 Gram(s) Oral once  dextrose 5%. 1000 milliLiter(s) (50 mL/Hr) IV Continuous <Continuous>  dextrose 5%. 1000 milliLiter(s) (100 mL/Hr) IV Continuous <Continuous>  dextrose 50% Injectable 25 Gram(s) IV Push once  dextrose 50% Injectable 12.5 Gram(s) IV Push once  dextrose 50% Injectable 25 Gram(s) IV Push once  fluticasone furoate/umeclidinium/vilanterol 100-62.5-25 MICROgram(s) Inhaler 1 Puff(s) Inhalation daily  folic acid 1 milliGRAM(s) Oral daily  furosemide   Injectable 60 milliGRAM(s) IV Push every 12 hours  glucagon  Injectable 1 milliGRAM(s) IntraMuscular once  hydrALAZINE 25 milliGRAM(s) Oral three times a day  insulin lispro (ADMELOG) corrective regimen sliding scale   SubCutaneous three times a day before meals  insulin lispro (ADMELOG) corrective regimen sliding scale   SubCutaneous at bedtime  isosorbide   mononitrate ER Tablet (IMDUR) 30 milliGRAM(s) Oral daily  levothyroxine 300 MICROGram(s) Oral daily  metoprolol tartrate 25 milliGRAM(s) Oral every 8 hours  nystatin Powder 1 Application(s) Topical three times a day  senna 2 Tablet(s) Oral at bedtime  tamsulosin 0.4 milliGRAM(s) Oral at bedtime    MEDICATIONS  (PRN):  acetaminophen     Tablet .. 650 milliGRAM(s) Oral every 6 hours PRN Temp greater or equal to 38C (100.4F), Mild Pain (1 - 3), Moderate Pain (4 - 6)  albuterol/ipratropium for Nebulization 3 milliLiter(s) Nebulizer every 6 hours PRN Shortness of Breath and/or Wheezing  ondansetron Injectable 4 milliGRAM(s) IV Push every 6 hours PRN Nausea and/or Vomiting  oxyCODONE    IR 5 milliGRAM(s) Oral every 6 hours PRN Severe Pain (7 - 10)  polyethylene glycol 3350 17 Gram(s) Oral daily PRN Constipation        I&O's Summary    09 Mar 2022 07:01  -  10 Mar 2022 07:00  --------------------------------------------------------  IN: 540 mL / OUT: 950 mL / NET: -410 mL    10 Mar 2022 07:01  -  10 Mar 2022 11:07  --------------------------------------------------------  IN: 0 mL / OUT: 1200 mL / NET: -1200 mL        PHYSICAL EXAM:  Vital Signs Last 24 Hrs  T(C): 36.3 (10 Mar 2022 09:20), Max: 36.7 (09 Mar 2022 21:30)  T(F): 97.4 (10 Mar 2022 09:20), Max: 98.1 (09 Mar 2022 21:30)  HR: 52 (10 Mar 2022 09:20) (49 - 64)  BP: 146/69 (10 Mar 2022 09:20) (146/67 - 156/78)  BP(mean): 93 (09 Mar 2022 15:35) (93 - 93)  RR: 18 (10 Mar 2022 09:20) (18 - 19)  SpO2: 96% (10 Mar 2022 09:20) (91% - 98%)      CONSTITUTIONAL: obtunded, Obese  ENMT: Moist oral mucosa, no pharyngeal injection or exudates;  RESPIRATORY: Normal respiratory effort; lungs are clear to auscultation bilaterally  CARDIOVASCULAR: Regular rate and rhythm, normal S1 and S2,  3+ pitting lower extremity edema;  : + scrotal swelling  ABDOMEN: Nontender to palpation, normoactive bowel sounds, no rebound/guarding  MUSCLOSKELETAL:  no joint swelling or tenderness to palpation  PSYCH: A+O to person, place, and time; affect appropriate  NEUROLOGY: CN 2-12 are intact and symmetric; no gross sensory deficits;   SKIN: bilateral chronic venous insufficiency       LABS:                        10.7   6.10  )-----------( 168      ( 10 Mar 2022 06:37 )             36.1     03-10    142  |  97<L>  |  32.1<H>  ----------------------------<  162<H>  5.2   |  40.0<H>  |  1.19    Ca    9.4      10 Mar 2022 06:37  Mg     2.1     03-10                CAPILLARY BLOOD GLUCOSE      POCT Blood Glucose.: 157 mg/dL (10 Mar 2022 09:16)  POCT Blood Glucose.: 157 mg/dL (10 Mar 2022 08:06)  POCT Blood Glucose.: 191 mg/dL (09 Mar 2022 21:29)  POCT Blood Glucose.: 190 mg/dL (09 Mar 2022 21:20)  POCT Blood Glucose.: 252 mg/dL (09 Mar 2022 17:00)  POCT Blood Glucose.: 180 mg/dL (09 Mar 2022 11:56)        RADIOLOGY & ADDITIONAL TESTS:  Results Reviewed:   Imaging Personally Reviewed:  Electrocardiogram Personally Reviewed:

## 2022-03-10 NOTE — PROGRESS NOTE ADULT - SUBJECTIVE AND OBJECTIVE BOX
PULMONARY PROGRESS NOTE      HILDA VELOZGEO-[id number]    Patient is a 81y old  Male who presents with a chief complaint of Scrotal swelling (09 Mar 2022 09:52)  [place]  [place]  COPD  DNR    INTERVAL HPI/OVERNIGHT EVENTS:    Comfortable   Tolerated AVAPs 3 hours last night   Says pressure to high  Trialed at bedside - settings seem appropriate    MEDICATIONS  (STANDING):  cholecalciferol 1000 Unit(s) Oral daily  cyanocobalamin 1000 MICROGram(s) Oral daily  dextrose 40% Gel 15 Gram(s) Oral once  dextrose 5%. 1000 milliLiter(s) (50 mL/Hr) IV Continuous <Continuous>  dextrose 5%. 1000 milliLiter(s) (100 mL/Hr) IV Continuous <Continuous>  dextrose 50% Injectable 25 Gram(s) IV Push once  dextrose 50% Injectable 12.5 Gram(s) IV Push once  dextrose 50% Injectable 25 Gram(s) IV Push once  fluticasone furoate/umeclidinium/vilanterol 100-62.5-25 MICROgram(s) Inhaler 1 Puff(s) Inhalation daily  folic acid 1 milliGRAM(s) Oral daily  furosemide   Injectable 40 milliGRAM(s) IV Push every 12 hours  glucagon  Injectable 1 milliGRAM(s) IntraMuscular once  heparin   Injectable 5000 Unit(s) SubCutaneous every 8 hours  hydrALAZINE 25 milliGRAM(s) Oral three times a day  insulin lispro (ADMELOG) corrective regimen sliding scale   SubCutaneous three times a day before meals  insulin lispro (ADMELOG) corrective regimen sliding scale   SubCutaneous at bedtime  isosorbide   mononitrate ER Tablet (IMDUR) 30 milliGRAM(s) Oral daily  levothyroxine 300 MICROGram(s) Oral daily  methylPREDNISolone sodium succinate Injectable 40 milliGRAM(s) IV Push every 12 hours  metoprolol succinate  milliGRAM(s) Oral daily  nystatin Powder 1 Application(s) Topical three times a day  senna 2 Tablet(s) Oral at bedtime  tamsulosin 0.4 milliGRAM(s) Oral at bedtime      MEDICATIONS  (PRN):  acetaminophen     Tablet .. 650 milliGRAM(s) Oral every 6 hours PRN Temp greater or equal to 38C (100.4F), Mild Pain (1 - 3), Moderate Pain (4 - 6)  albuterol/ipratropium for Nebulization 3 milliLiter(s) Nebulizer every 6 hours PRN Shortness of Breath and/or Wheezing  ondansetron Injectable 4 milliGRAM(s) IV Push every 6 hours PRN Nausea and/or Vomiting  oxyCODONE    IR 5 milliGRAM(s) Oral every 6 hours PRN Severe Pain (7 - 10)  polyethylene glycol 3350 17 Gram(s) Oral daily PRN Constipation      Allergies    sulfamethoxazole (Urticaria)    Intolerances        PAST MEDICAL & SURGICAL HISTORY:  Chronic obstructive pulmonary disease (COPD)    DM (diabetes mellitus), type 2    HTN (hypertension)    HLD (hyperlipidemia)    Lung nodules    2019 novel coronavirus disease (COVID-19)    History of hernia repair        SOCIAL HISTORY  Smoking History:       REVIEW OF SYSTEMS:    CONSTITUTIONAL:  No distress    HEENT:  Eyes:  No diplopia or blurred vision. ENT:  No earache, sore throat or runny nose.    CARDIOVASCULAR:  No pressure, squeezing, tightness, heaviness or aching about the chest; no palpitations.    RESPIRATORY:  No cough, shortness of breath, PND or orthopnea. Mild SOBOE    GASTROINTESTINAL:  No nausea, vomiting or diarrhea.    GENITOURINARY:  No dysuria, frequency or urgency.    NEUROLOGIC:  No paresthesias, fasciculations, seizures or weakness.    PSYCHIATRIC:  No disorder of thought or mood.    Vital Signs Last 24 Hrs  T(C): 36.5 (09 Mar 2022 08:20), Max: 36.8 (08 Mar 2022 21:00)  T(F): 97.7 (09 Mar 2022 08:20), Max: 98.2 (08 Mar 2022 21:00)  HR: 56 (09 Mar 2022 08:20) (56 - 62)  BP: 126/58 (09 Mar 2022 08:20) (126/58 - 157/73)  BP(mean): 94 (08 Mar 2022 15:57) (89 - 94)  RR: 18 (09 Mar 2022 08:20) (18 - 18)  SpO2: 95% (09 Mar 2022 08:20) (92% - 95%)    PHYSICAL EXAMINATION:    GENERAL: The patient is awake and alert in no apparent distress.     HEENT: Head is normocephalic and atraumatic. Extraocular muscles are intact. Mucous membranes are moist.    NECK: Supple.    LUNGS: Clear to auscultation without wheezing, rales or rhonchi; respirations unlabored    HEART: Regular rate and rhythm without murmur.    ABDOMEN: Soft, nontender, and nondistended.      EXTREMITIES: Without any cyanosis, clubbing, rash, lesions or edema.    NEUROLOGIC: Grossly intact.    LABS:                        10.4   5.50  )-----------( 171      ( 09 Mar 2022 06:37 )             35.0     03-09    143  |  96<L>  |  26.6<H>  ----------------------------<  184<H>  4.8   |  38.0<H>  |  1.24    Ca    9.2      09 Mar 2022 06:37  Mg     1.8     03-09    TPro  6.8  /  Alb  3.4  /  TBili  1.2  /  DBili  x   /  AST  15  /  ALT  9   /  AlkPhos  76  03-08      Serum Pro-Brain Natriuretic Peptide: 3840 pg/mL (03-06-22 @ 11:52)      RADIOLOGY & ADDITIONAL STUDIES:    CXR on 3/6/22  Small bilat effusions  CM vs poor insp    Duplex LE 3/8/22  Neg    Echo 3/7/22   1. Left ventricular ejection fraction, by visual estimation, is 50 to   55%.   2. Normal global left ventricular systolic function.   3. Moderately increased LV wall thickness.   4. Moderately enlarged right ventricle. Mildly reduced RV systolic   function.   5. Right ventricular volume overload.   6. Moderate biatrial enlargement.   7. Moderate tricuspid regurgitation.   8. Estimated pulmonary artery systolic pressure is 52.2 mmHg assuming a   right atrial pressure of 15 mmHg, which is consistent with moderate   pulmonary hypertension.   9. Thickening of the anterior and posterior mitral valve leaflets.  10. Mild mitral annular calcification.  11. Trace mitral valve regurgitation.  12. Dilatation of the ascending aorta, measuring approx 3.67 cm.  13. Sclerotic aortic valve with normal opening.  14. There is no evidence of pericardial effusion.  15. Endocardial visualization was enhancedwith intravenous echo contrast.  16. There are no prior studies on this patient for comparison purposes.    Gayla Ramirez MD Electronically signed on 3/7/2022 at 12:38:54 PM

## 2022-03-11 ENCOUNTER — TRANSCRIPTION ENCOUNTER (OUTPATIENT)
Age: 82
End: 2022-03-11

## 2022-03-11 LAB
ANION GAP SERPL CALC-SCNC: 7 MMOL/L — SIGNIFICANT CHANGE UP (ref 5–17)
BUN SERPL-MCNC: 35.1 MG/DL — HIGH (ref 8–20)
CALCIUM SERPL-MCNC: 9.1 MG/DL — SIGNIFICANT CHANGE UP (ref 8.6–10.2)
CHLORIDE SERPL-SCNC: 95 MMOL/L — LOW (ref 98–107)
CO2 SERPL-SCNC: 40 MMOL/L — HIGH (ref 22–29)
CREAT SERPL-MCNC: 1.14 MG/DL — SIGNIFICANT CHANGE UP (ref 0.5–1.3)
EGFR: 65 ML/MIN/1.73M2 — SIGNIFICANT CHANGE UP
GLUCOSE BLDC GLUCOMTR-MCNC: 130 MG/DL — HIGH (ref 70–99)
GLUCOSE BLDC GLUCOMTR-MCNC: 133 MG/DL — HIGH (ref 70–99)
GLUCOSE BLDC GLUCOMTR-MCNC: 135 MG/DL — HIGH (ref 70–99)
GLUCOSE BLDC GLUCOMTR-MCNC: 177 MG/DL — HIGH (ref 70–99)
GLUCOSE SERPL-MCNC: 133 MG/DL — HIGH (ref 70–99)
HCT VFR BLD CALC: 36.8 % — LOW (ref 39–50)
HGB BLD-MCNC: 11.1 G/DL — LOW (ref 13–17)
MAGNESIUM SERPL-MCNC: 2 MG/DL — SIGNIFICANT CHANGE UP (ref 1.6–2.6)
MCHC RBC-ENTMCNC: 29.6 PG — SIGNIFICANT CHANGE UP (ref 27–34)
MCHC RBC-ENTMCNC: 30.2 GM/DL — LOW (ref 32–36)
MCV RBC AUTO: 98.1 FL — SIGNIFICANT CHANGE UP (ref 80–100)
PLATELET # BLD AUTO: 167 K/UL — SIGNIFICANT CHANGE UP (ref 150–400)
POTASSIUM SERPL-MCNC: 4.8 MMOL/L — SIGNIFICANT CHANGE UP (ref 3.5–5.3)
POTASSIUM SERPL-SCNC: 4.8 MMOL/L — SIGNIFICANT CHANGE UP (ref 3.5–5.3)
RBC # BLD: 3.75 M/UL — LOW (ref 4.2–5.8)
RBC # FLD: 17.2 % — HIGH (ref 10.3–14.5)
SODIUM SERPL-SCNC: 142 MMOL/L — SIGNIFICANT CHANGE UP (ref 135–145)
WBC # BLD: 8.95 K/UL — SIGNIFICANT CHANGE UP (ref 3.8–10.5)
WBC # FLD AUTO: 8.95 K/UL — SIGNIFICANT CHANGE UP (ref 3.8–10.5)

## 2022-03-11 PROCEDURE — 99497 ADVNCD CARE PLAN 30 MIN: CPT

## 2022-03-11 PROCEDURE — 99233 SBSQ HOSP IP/OBS HIGH 50: CPT

## 2022-03-11 RX ORDER — POLYETHYLENE GLYCOL 3350 17 G/17G
17 POWDER, FOR SOLUTION ORAL DAILY
Refills: 0 | Status: DISCONTINUED | OUTPATIENT
Start: 2022-03-11 | End: 2022-03-13

## 2022-03-11 RX ADMIN — Medication 60 MILLIGRAM(S): at 17:33

## 2022-03-11 RX ADMIN — ISOSORBIDE MONONITRATE 30 MILLIGRAM(S): 60 TABLET, EXTENDED RELEASE ORAL at 12:32

## 2022-03-11 RX ADMIN — Medication 60 MILLIGRAM(S): at 05:37

## 2022-03-11 RX ADMIN — Medication 1000 UNIT(S): at 12:32

## 2022-03-11 RX ADMIN — Medication 25 MILLIGRAM(S): at 05:37

## 2022-03-11 RX ADMIN — Medication 300 MICROGRAM(S): at 05:37

## 2022-03-11 RX ADMIN — Medication 1 MILLIGRAM(S): at 12:32

## 2022-03-11 RX ADMIN — NYSTATIN CREAM 1 APPLICATION(S): 100000 CREAM TOPICAL at 05:36

## 2022-03-11 RX ADMIN — TAMSULOSIN HYDROCHLORIDE 0.4 MILLIGRAM(S): 0.4 CAPSULE ORAL at 22:08

## 2022-03-11 RX ADMIN — POLYETHYLENE GLYCOL 3350 17 GRAM(S): 17 POWDER, FOR SOLUTION ORAL at 12:34

## 2022-03-11 RX ADMIN — Medication 25 MILLIGRAM(S): at 22:09

## 2022-03-11 RX ADMIN — NYSTATIN CREAM 1 APPLICATION(S): 100000 CREAM TOPICAL at 17:32

## 2022-03-11 RX ADMIN — Medication 1: at 12:32

## 2022-03-11 RX ADMIN — OXYCODONE HYDROCHLORIDE 5 MILLIGRAM(S): 5 TABLET ORAL at 10:02

## 2022-03-11 RX ADMIN — SENNA PLUS 2 TABLET(S): 8.6 TABLET ORAL at 22:08

## 2022-03-11 RX ADMIN — PREGABALIN 1000 MICROGRAM(S): 225 CAPSULE ORAL at 12:32

## 2022-03-11 RX ADMIN — OXYCODONE HYDROCHLORIDE 5 MILLIGRAM(S): 5 TABLET ORAL at 09:02

## 2022-03-11 NOTE — CONSULT NOTE ADULT - ASSESSMENT
80 yo male with avendano failure, scrotal edema  - cesar flushed, balloon deflated and repositioned, balloon reinflated +550ml urine now drained  - elevate scrotum on rolled up towel  - no  intervention required at this time

## 2022-03-11 NOTE — PROGRESS NOTE ADULT - SUBJECTIVE AND OBJECTIVE BOX
[facility]                                   [address] [place] [address]                                                      PHONE: [phone]                                                         FAX: [phone]  [email or web address]  ---------------------------------------------------------------------------------------------------------------------------------    Overnight events/patient complaints: declined BiPAP overnight and this am. Denies chest pain currently     INTERPRETATION OF TELEMETRY (personally reviewed):    t< from: TTE Echo Complete w/o Contrast w/ Doppler (03.07.22 @ 11:33) >    Summary:   1. Left ventricular ejection fraction, by visual estimation, is 50 to 55%.   2. Normal global left ventricular systolic function.   3. Moderatelyincreased LV wall thickness.   4. Moderately enlarged right ventricle. Mildly reduced RV systolic function.   5. Right ventricular volume overload.   6. Moderate biatrial enlargement.   7. Moderate tricuspid regurgitation.   8. Estimated pulmonary artery systolic pressure is 52.2 mmHg assuming a right atrial pressure of 15 mmHg, which is consistent with moderate pulmonary hypertension.   9. Thickening of the anterior and posterior mitral valve leaflets.  10. Mild mitral annular calcification.  11. Trace mitral valve regurgitation.  12. Dilatation of the ascending aorta, measuring approx 3.67 cm.  13. Sclerotic aortic valve with normal opening.  14. There is no evidence of pericardial effusion.  15. Endocardial visualization was enhancedwith intravenous echo contrast.  16. There are no prior studies on this patient for comparison purposes.      10 Mar 2022 07:01  -  11 Mar 2022 07:00  --------------------------------------------------------  IN: 760 mL / OUT: 3910 mL / NET: -3150 mL      PAST MEDICAL & SURGICAL HISTORY:  Chronic obstructive pulmonary disease (COPD)  DM (diabetes mellitus), type 2  HTN (hypertension)  HLD (hyperlipidemia)  Lung nodules  2019 novel coronavirus disease (COVID-19)  History of hernia repair    sulfamethoxazole (Urticaria)    MEDICATIONS  (STANDING):  cholecalciferol 1000 Unit(s) Oral daily  cyanocobalamin 1000 MICROGram(s) Oral daily  dextrose 40% Gel 15 Gram(s) Oral once  dextrose 5%. 1000 milliLiter(s) (50 mL/Hr) IV Continuous <Continuous>  dextrose 5%. 1000 milliLiter(s) (100 mL/Hr) IV Continuous <Continuous>  dextrose 50% Injectable 25 Gram(s) IV Push once  dextrose 50% Injectable 12.5 Gram(s) IV Push once  dextrose 50% Injectable 25 Gram(s) IV Push once  fluticasone furoate/umeclidinium/vilanterol 100-62.5-25 MICROgram(s) Inhaler 1 Puff(s) Inhalation daily  folic acid 1 milliGRAM(s) Oral daily  furosemide   Injectable 60 milliGRAM(s) IV Push every 12 hours  glucagon  Injectable 1 milliGRAM(s) IntraMuscular once  hydrALAZINE 25 milliGRAM(s) Oral three times a day  insulin lispro (ADMELOG) corrective regimen sliding scale   SubCutaneous three times a day before meals  insulin lispro (ADMELOG) corrective regimen sliding scale   SubCutaneous at bedtime  isosorbide   mononitrate ER Tablet (IMDUR) 30 milliGRAM(s) Oral daily  levothyroxine 300 MICROGram(s) Oral daily  metoprolol tartrate 25 milliGRAM(s) Oral every 8 hours  nystatin Powder 1 Application(s) Topical three times a day  senna 2 Tablet(s) Oral at bedtime  tamsulosin 0.4 milliGRAM(s) Oral at bedtime    MEDICATIONS  (PRN):  acetaminophen     Tablet .. 650 milliGRAM(s) Oral every 6 hours PRN Temp greater or equal to 38C (100.4F), Mild Pain (1 - 3), Moderate Pain (4 - 6)  albuterol/ipratropium for Nebulization 3 milliLiter(s) Nebulizer every 6 hours PRN Shortness of Breath and/or Wheezing  ondansetron Injectable 4 milliGRAM(s) IV Push every 6 hours PRN Nausea and/or Vomiting  oxyCODONE    IR 5 milliGRAM(s) Oral every 6 hours PRN Severe Pain (7 - 10)  polyethylene glycol 3350 17 Gram(s) Oral daily PRN Constipation      Vital Signs Last 24 Hrs  T(C): 36.5 (11 Mar 2022 07:53), Max: 36.7 (11 Mar 2022 04:09)  T(F): 97.7 (11 Mar 2022 07:53), Max: 98 (11 Mar 2022 04:09)  HR: 79 (11 Mar 2022 08:27) (52 - 79)  BP: 154/54 (11 Mar 2022 07:53) (121/45 - 158/64)  BP(mean): 83 (11 Mar 2022 00:00) (75 - 83)  RR: 20 (11 Mar 2022 07:53) (18 - 21)  SpO2: 98% (11 Mar 2022 08:27) (93% - 98%)  ICU Vital Signs Last 24 Hrs    PHYSICAL EXAM:  General: morbidly obese, chronically ill appearing   HEENT: Head; normocephalic, atraumatic. Pupils reactive, cornea wnl.  CARDIOVASCULAR: Normal S1 and S2, 2/6 STACIA, no rub, gallop or lift.   LUNGS: decreased breath sounds at the bases, poor effort  ABDOMEN: Soft, nontender without mass or organomegaly. bowel sounds normoactive.  EXTREMITIES: No clubbing, cyanosis, (+) LE edema.   SKIN: warm and dry with normal turgor.  NEURO: Alert/oriented x 3/normal motor exam.   PSYCH: normal affect.        LABS:                        11.1   8.95  )-----------( 167      ( 11 Mar 2022 06:38 )             36.8     03-11    142  |  95<L>  |  35.1<H>  ----------------------------<  133<H>  4.8   |  40.0<H>  |  1.14    Ca    9.1      11 Mar 2022 06:38  Mg     2.0     03-11      ASSESSMENT AND PLAN:  Patient is a morbidly obese former smoker man with COPD, COVID 19 c/b respiratory failure with prolonged hospitalization, discharge on home O2 and now with chronic respiratory failure requiring 4L O2, hypertension, suspected lymphedema, who has had increasingly sedentary lifestyle since his last hospitalization who presents with dyspnea, increased edema and scrotal edema, found to have acute on chronic hypoxic respiratory failure     Acute on chronic hypoxic respiratory failure/hypervolemia/NSVT  - continue BiPAP support as tolerated, consider high flow if not tolerating biPAP   - continue lasix 60mg IV   - continue imdur with hydralazine 25mg TID for further afterload reduction  - continue metoprolol   - strict I/O and daily weight    - TTE reviewed   - daily renal indices, Cr noted, avoid nephrotoxins  - eventual LHC/RHC  - may benefit from external compression of the bilateral LE   - tele    Thank you for allowing [facility] to participate in the care of this patient.  Please feel free to call with any questions or concerns.

## 2022-03-11 NOTE — DIETITIAN INITIAL EVALUATION ADULT. - PERTINENT LABORATORY DATA
03-11 Na142 mmol/L Glu 133 mg/dL<H> K+ 4.8 mmol/L Cr  1.14 mg/dL BUN 35.1 mg/dL<H> Phos n/a   Alb n/a   PAB n/a

## 2022-03-11 NOTE — CONSULT NOTE ADULT - SUBJECTIVE AND OBJECTIVE BOX
HPI:  81 year old male with history of Lymphedema, Morbid Obesity, COPD on 4LNC, Smoking (stopped 3 years ago), Lung Nodules, HTN, Hypothyroidism, RA and COVID-19 brought by daughter with hypoxia and scrotal swelling. As per patient, he has noticed scrotal swelling for 10 days and it is getting worse along with lymphedema. He is also complaining of exertional dyspnea. Denies orthopnea or paroxysmal nocturnal dyspnea. Denies fever, sick contacts or recent travel.   Patient denies history of CHF. Has not seen Cardiologist in 3 years. He takes Lasix for lymphedema however he is not compliant with it.   In ER, he was found to be hypoxic to 88%, supplemental oxygen was increased to 5LNC. He was noted to have anasarca and was given a dose of Lasix 40 mg x 1.  (06 Mar 2022 16:53)    Urology: called for scrotal swelling and leaking cesar. Pt resting in bed comfortably.  Pt obese, cesar in place, yellow/sinai urine output. Pt has c/o full bladder and pain.    PAST MEDICAL & SURGICAL HISTORY:  Chronic obstructive pulmonary disease (COPD)    DM (diabetes mellitus), type 2    HTN (hypertension)    HLD (hyperlipidemia)    Lung nodules    2019 novel coronavirus disease (COVID-19)    History of hernia repair        acetaminophen     Tablet .. 650 milliGRAM(s) Oral every 6 hours PRN  albuterol/ipratropium for Nebulization 3 milliLiter(s) Nebulizer every 6 hours PRN  cholecalciferol 1000 Unit(s) Oral daily  cyanocobalamin 1000 MICROGram(s) Oral daily  dextrose 40% Gel 15 Gram(s) Oral once  dextrose 5%. 1000 milliLiter(s) IV Continuous <Continuous>  dextrose 5%. 1000 milliLiter(s) IV Continuous <Continuous>  dextrose 50% Injectable 25 Gram(s) IV Push once  dextrose 50% Injectable 12.5 Gram(s) IV Push once  dextrose 50% Injectable 25 Gram(s) IV Push once  fluticasone furoate/umeclidinium/vilanterol 100-62.5-25 MICROgram(s) Inhaler 1 Puff(s) Inhalation daily  folic acid 1 milliGRAM(s) Oral daily  furosemide   Injectable 60 milliGRAM(s) IV Push every 12 hours  glucagon  Injectable 1 milliGRAM(s) IntraMuscular once  hydrALAZINE 25 milliGRAM(s) Oral three times a day  insulin lispro (ADMELOG) corrective regimen sliding scale   SubCutaneous three times a day before meals  insulin lispro (ADMELOG) corrective regimen sliding scale   SubCutaneous at bedtime  isosorbide   mononitrate ER Tablet (IMDUR) 30 milliGRAM(s) Oral daily  levothyroxine 300 MICROGram(s) Oral daily  metoprolol tartrate 25 milliGRAM(s) Oral every 8 hours  nystatin Powder 1 Application(s) Topical three times a day  ondansetron Injectable 4 milliGRAM(s) IV Push every 6 hours PRN  oxyCODONE    IR 5 milliGRAM(s) Oral every 6 hours PRN  polyethylene glycol 3350 17 Gram(s) Oral daily  senna 2 Tablet(s) Oral at bedtime  tamsulosin 0.4 milliGRAM(s) Oral at bedtime      sulfamethoxazole (Urticaria)      T(C): 36.5 (03-11-22 @ 07:53), Max: 36.7 (03-11-22 @ 04:09)  HR: 79 (03-11-22 @ 08:27) (52 - 79)  BP: 154/54 (03-11-22 @ 07:53) (121/45 - 158/64)  RR: 20 (03-11-22 @ 07:53) (18 - 21)  SpO2: 98% (03-11-22 @ 08:27) (93% - 98%)      03-10-22 @ 07:01  -  03-11-22 @ 07:00  --------------------------------------------------------  IN: 760 mL / OUT: 3910 mL / NET: -3150 mL                              11.1   8.95  )-----------( 167      ( 11 Mar 2022 06:38 )             36.8       03-11    142  |  95<L>  |  35.1<H>  ----------------------------<  133<H>  4.8   |  40.0<H>  |  1.14    Ca    9.1      11 Mar 2022 06:38  Mg     2.0     03-11                Radiology:

## 2022-03-11 NOTE — DISCHARGE NOTE NURSING/CASE MANAGEMENT/SOCIAL WORK - NSSCNAMETXT_GEN_ALL_CORE
[facility] (formerly [facility])

## 2022-03-11 NOTE — PROGRESS NOTE ADULT - SUBJECTIVE AND OBJECTIVE BOX
[facility] Division of Hospital Medicine    Chief Complaint:  scrotal swelling    SUBJECTIVE / OVERNIGHT EVENTS: Patient woke up yesterday afternoon. He refused to wear AVAPs overnight. This am he desaturated to 83% on 5L Nc. Placed on AVAPS and sats improved. Patient seen and examined. He states he does not like wearing the AVAPs. Does not want to wear it at night but will wear it now to improve his breathing. Still with edema. Hematuria improved.    Patient denies chest pain, SOB, abd pain, N/V, fever, chills, dysuria or any other complaints. All remainder ROS negative.     MEDICATIONS  (STANDING):  cholecalciferol 1000 Unit(s) Oral daily  cyanocobalamin 1000 MICROGram(s) Oral daily  dextrose 40% Gel 15 Gram(s) Oral once  dextrose 5%. 1000 milliLiter(s) (50 mL/Hr) IV Continuous <Continuous>  dextrose 5%. 1000 milliLiter(s) (100 mL/Hr) IV Continuous <Continuous>  dextrose 50% Injectable 25 Gram(s) IV Push once  dextrose 50% Injectable 12.5 Gram(s) IV Push once  dextrose 50% Injectable 25 Gram(s) IV Push once  fluticasone furoate/umeclidinium/vilanterol 100-62.5-25 MICROgram(s) Inhaler 1 Puff(s) Inhalation daily  folic acid 1 milliGRAM(s) Oral daily  furosemide   Injectable 60 milliGRAM(s) IV Push every 12 hours  glucagon  Injectable 1 milliGRAM(s) IntraMuscular once  hydrALAZINE 25 milliGRAM(s) Oral three times a day  insulin lispro (ADMELOG) corrective regimen sliding scale   SubCutaneous three times a day before meals  insulin lispro (ADMELOG) corrective regimen sliding scale   SubCutaneous at bedtime  isosorbide   mononitrate ER Tablet (IMDUR) 30 milliGRAM(s) Oral daily  levothyroxine 300 MICROGram(s) Oral daily  metoprolol tartrate 25 milliGRAM(s) Oral every 8 hours  nystatin Powder 1 Application(s) Topical three times a day  senna 2 Tablet(s) Oral at bedtime  tamsulosin 0.4 milliGRAM(s) Oral at bedtime    MEDICATIONS  (PRN):  acetaminophen     Tablet .. 650 milliGRAM(s) Oral every 6 hours PRN Temp greater or equal to 38C (100.4F), Mild Pain (1 - 3), Moderate Pain (4 - 6)  albuterol/ipratropium for Nebulization 3 milliLiter(s) Nebulizer every 6 hours PRN Shortness of Breath and/or Wheezing  ondansetron Injectable 4 milliGRAM(s) IV Push every 6 hours PRN Nausea and/or Vomiting  oxyCODONE    IR 5 milliGRAM(s) Oral every 6 hours PRN Severe Pain (7 - 10)  polyethylene glycol 3350 17 Gram(s) Oral daily PRN Constipation        I&O's Summary    10 Mar 2022 07:01  -  11 Mar 2022 07:00  --------------------------------------------------------  IN: 760 mL / OUT: 3910 mL / NET: -3150 mL        PHYSICAL EXAM:  Vital Signs Last 24 Hrs  T(C): 36.5 (11 Mar 2022 07:53), Max: 36.7 (11 Mar 2022 04:09)  T(F): 97.7 (11 Mar 2022 07:53), Max: 98 (11 Mar 2022 04:09)  HR: 79 (11 Mar 2022 08:27) (52 - 79)  BP: 154/54 (11 Mar 2022 07:53) (121/45 - 158/64)  BP(mean): 83 (11 Mar 2022 00:00) (75 - 83)  RR: 20 (11 Mar 2022 07:53) (18 - 21)  SpO2: 98% (11 Mar 2022 08:27) (93% - 98%)        CONSTITUTIONAL: NAD, obese  ENMT: Moist oral mucosa, no pharyngeal injection or exudates;   RESPIRATORY: Normal respiratory effort; devreased breath sounds in the bases. , on AVAPS  CARDIOVASCULAR: Regular rate and rhythm, normal S1 and S2,  4+ pitting edema  : + scrotal edema, + cesar  ABDOMEN: Nontender to palpation, normoactive bowel sounds, no rebound/guarding;   MUSCLOSKELETAL:  no joint swelling or tenderness to palpation  PSYCH: A+O to person, place, and time; affect appropriate  NEUROLOGY: CN 2-12 are intact and symmetric; no gross sensory deficits;   SKIN: No rashes; no palpable lesions    LABS:                        11.1   8.95  )-----------( 167      ( 11 Mar 2022 06:38 )             36.8     03-11    142  |  95<L>  |  35.1<H>  ----------------------------<  133<H>  4.8   |  40.0<H>  |  1.14    Ca    9.1      11 Mar 2022 06:38  Mg     2.0     03-11                CAPILLARY BLOOD GLUCOSE      POCT Blood Glucose.: 135 mg/dL (11 Mar 2022 08:34)  POCT Blood Glucose.: 159 mg/dL (10 Mar 2022 21:45)  POCT Blood Glucose.: 153 mg/dL (10 Mar 2022 17:12)  POCT Blood Glucose.: 164 mg/dL (10 Mar 2022 11:55)        RADIOLOGY & ADDITIONAL TESTS:  Results Reviewed:   Imaging Personally Reviewed:  Electrocardiogram Personally Reviewed:

## 2022-03-11 NOTE — DISCHARGE NOTE NURSING/CASE MANAGEMENT/SOCIAL WORK - NSDCPEFALRISK_GEN_ALL_CORE
For information on Fall & Injury Prevention, visit: [email or web address] OR  [email or web address] OR  https://www.cdc.gov/steadi/patient.html

## 2022-03-11 NOTE — DISCHARGE NOTE NURSING/CASE MANAGEMENT/SOCIAL WORK - NSDCFUADDAPPT_GEN_ALL_CORE_FT
[facility] PT:  A. FOLLOW-UP APPOINTMENT:  1. PULMONOLOGY   Dr. Dmitry Coburn,  March , 2022 at   [address], [place] [address]  Phone: [phone]    2. CARDIOLOGY      B. [facility] SERVICE  [facility] (formerly [facility])   START CARE ON     C. PT AGREEABLE TO MEDS-TO-BED w/ VIVO, for education on medication    d. Yellow Folder given.  [facility] PT:  A. FOLLOW-UP APPOINTMENT:  1. PULMONOLOGY   Dr. Dmitry Coburn,  March , 2022 at   [address], [place] [address]  Phone: [phone]    2. CARDIOLOGY  MARCH 22, 2022 at 10:15 am  Dr. Vera [address] [place] [address]  Phone: [phone]    B. [facility] SERVICE  [facility] (formerly [facility])   START CARE ON     C. PT AGREEABLE TO MEDS-TO-BED w/ VIVO, for education on medication    d. Yellow Folder given.  [facility] PT:  A. FOLLOW-UP APPOINTMENT:  1. PULMONOLOGY   Almas Interiano  TUESDAY March 29, 2022 at 11:30am  [address], [place] [address]  Phone: [phone]    2. CARDIOLOGY  TUESDAY MARCH 22, 2022 at 10:15 am  Dr. Vera [address] [place] [address]  Phone: [phone]    B. [facility] SERVICE  [facility] (formerly [facility])   START CARE ON     C. PT AGREEABLE TO MEDS-TO-BED w/ VIVO, for education on medication    d. Yellow Folder given.  [facility] PT:  A. FOLLOW-UP APPOINTMENT:  1. PULMONOLOGY   Almas Interiano  TUESDAY March 29, 2022 at 11:30am  [address], [place] [address]  Phone: [phone]    2. CARDIOLOGY  TUESDAY MARCH 22, 2022 at 10:15 am  Dr. Vera [address] [place] [address]  Phone: [phone]    3. Yellow Folder given.

## 2022-03-11 NOTE — DIETITIAN INITIAL EVALUATION ADULT. - OTHER INFO
Pt is a 81 year old male with history of Lymphedema, Morbid Obesity, COPD on 4LNC, Smoking (stopped 3 years ago), Lung Nodules, HTN, Hypothyroidism, RA and COVID-19 brought by daughter with hypoxia and scrotal swelling. In ER, he was found to be hypoxic to 88%, supplemental oxygen was increased to 5LNC. He was noted to have anasarca and was given a dose of Lasix 40 mg x 1.   Pt admitted with Anasarca/ Acute on chronic HFpEF (EF 50-55%)/Moderate Pulmonary Hypertension, Acute on Chronic hypercapnic respiratory failure due to  COPD and CHF, Right Pleural Effusion.   Pt report eating fairly well at home; eats 3 meals at home which family cooks/prepares for him. Pt reports slightly decreased PO intake secondary to difficulty breathing PTA.  Pt reports 71# unintentional weight gain over past few weeks secondary to fluid retention. Reviewed therapeutic diet guidelines, pt declined further literature at this time. Food preferences obtained.

## 2022-03-11 NOTE — PROGRESS NOTE ADULT - ASSESSMENT
81 year old male with history of Lymphedema, Morbid Obesity, COPD on 4LNC, Smoking (stopped 3 years ago), Lung Nodules, HTN, Hypothyroidism, RA and COVID-19 brought by daughter with hypoxia and scrotal swelling. In ER, he was found to be hypoxic to 88%, supplemental oxygen was increased to 5LNC. He was noted to have anasarca and was given a dose of Lasix 40 mg x 1.     Anasarca/ Acute on chronic HFpEF (EF 50-55%)/Moderate Pulmonary Hypertension, unchanged remains grossly fluid overloaded  - Strict intake/output and daily weight  - Lasix 60 mg IVP q 12 hours  - Hydralazine 25mg TID  - Imdur 30mg daily  - [facility] Cardiology following, will need RHC/LHC when volume status improved.     Acute on Chronic hypercapnic respiratory failure due to  COPD and CHF, worsening  on 4L NC at baseline  ABG with co2 78  - Continue AVAPS at night and PRN. Patient is DNR/DNI  - Symbicort and Spiriva (takes Trelegy at home)  - DuoNeb PRN  - Pulmonary consulted, rec to continue AVAPs  - O2 sats 88% and above acceptable     Right Pleural Effusion   - Lasix as above  - Consider thoracocentesis if no improvement     Scrotal Swelling  - Likely due to above  - Scrotal US wnl  - Continue Lasix  - Urology consulted    Left Renal Lesion (suspected RCC)   Hematuria, improved  - Dr. Marley Discussed with patient, he denies any history in past   - Needs further work up by Urology   - hold heparin    Renal Insufficiency possible CKD, stable  - Unknowns baseline renal function   - Avoid nephrotoxic medications  - Monitor renal function     BPH  - Shukla's catheter was placed in ER  - Flomax     Hypomagnesemia  - replaced     HTN  - Continue Metoprolol    DM 2  - HbA1c 6.9%  - Accu checks and ISS    Hypothyroidism  - Continue Synthroid     Rheumatoid Arthritis  - Gets weekly Methotrexate (on Friday), will order dose for today. Called family to find out dose, awaiting call back.   - Will avoid NSAIDs due to renal insufficiency  - Oxycodone PRN    DVT Prophylaxis -- Heparin SQ  DIspo: patient is critically ill requiring step down level of care. No plans for dc.   Advance Directives: DNR/BRENNON CABALLERO in alpha  Son-in-Law Mateo updated over the phone.  81 year old male with history of Lymphedema, Morbid Obesity, COPD on 4LNC, Smoking (stopped 3 years ago), Lung Nodules, HTN, Hypothyroidism, RA and COVID-19 brought by daughter with hypoxia and scrotal swelling. In ER, he was found to be hypoxic to 88%, supplemental oxygen was increased to 5LNC. He was noted to have anasarca and was given a dose of Lasix 40 mg x 1.     Anasarca/ Acute on chronic HFpEF (EF 50-55%)/Moderate Pulmonary Hypertension, unchanged remains grossly fluid overloaded  - Strict intake/output and daily weight  - Lasix 60 mg IVP q 12 hours  - Hydralazine 25mg TID  - Imdur 30mg daily  - [facility] Cardiology following, will need RHC/LHC when volume status improved.     Acute on Chronic hypercapnic respiratory failure due to  COPD and CHF, worsening  on 4L NC at baseline  ABG with co2 78  - Continue AVAPS at night and PRN. Patient is DNR/DNI  - Symbicort and Spiriva (takes Trelegy at home)  - DuoNeb PRN  - Pulmonary consulted, rec to continue AVAPs  - O2 sats 88% and above acceptable     Right Pleural Effusion   - Lasix as above  - Consider thoracocentesis if no improvement     Scrotal Swelling  - Likely due to above  - Scrotal US wnl  - Continue Lasix  - Urology consulted    Left Renal Lesion (suspected RCC)   Hematuria, improved  - Dr. Marley Discussed with patient, he denies any history in past   - Needs further work up by Urology   - hold heparin    Renal Insufficiency possible CKD, stable  - Unknowns baseline renal function   - Avoid nephrotoxic medications  - Monitor renal function     BPH  - Shukla's catheter was placed in ER  - Flomax     Hypomagnesemia  - replaced     HTN  - Continue Metoprolol    DM 2  - HbA1c 6.9%  - Accu checks and ISS    Hypothyroidism  - Continue Synthroid     Rheumatoid Arthritis  - Gets weekly Methotrexate (on Friday), will order dose for today. Called family to find out dose, awaiting call back.   - Will avoid NSAIDs due to renal insufficiency  - Oxycodone PRN    DVT Prophylaxis -- Heparin SQ.   DIspo: patient requiring step down level of care. No plans for dc.   Advance Directives: DNR/IPartha CABALLERO in alpha.   Son-in-Law Mateo and Daughter Jacqueline updated over the phone.

## 2022-03-11 NOTE — CONSULT NOTE ADULT - GENITOURINARY COMMENTS
edematous scrotum, no tender, cesar draining some urine, appears to be out further then It should be

## 2022-03-11 NOTE — DISCHARGE NOTE NURSING/CASE MANAGEMENT/SOCIAL WORK - PATIENT PORTAL LINK FT
You can access the FollowMyHealth Patient Portal offered by [facility] by registering at the following website: [email or web address]. By joining [facility]’s FollowMyHealth portal, you will also be able to view your health information using other applications (apps) compatible with our system.

## 2022-03-12 LAB
ANION GAP SERPL CALC-SCNC: 6 MMOL/L — SIGNIFICANT CHANGE UP (ref 5–17)
BUN SERPL-MCNC: 33.2 MG/DL — HIGH (ref 8–20)
CALCIUM SERPL-MCNC: 9 MG/DL — SIGNIFICANT CHANGE UP (ref 8.6–10.2)
CHLORIDE SERPL-SCNC: 97 MMOL/L — LOW (ref 98–107)
CO2 SERPL-SCNC: 42 MMOL/L — HIGH (ref 22–29)
CREAT SERPL-MCNC: 1.18 MG/DL — SIGNIFICANT CHANGE UP (ref 0.5–1.3)
EGFR: 63 ML/MIN/1.73M2 — SIGNIFICANT CHANGE UP
GLUCOSE BLDC GLUCOMTR-MCNC: 120 MG/DL — HIGH (ref 70–99)
GLUCOSE BLDC GLUCOMTR-MCNC: 154 MG/DL — HIGH (ref 70–99)
GLUCOSE BLDC GLUCOMTR-MCNC: 161 MG/DL — HIGH (ref 70–99)
GLUCOSE SERPL-MCNC: 121 MG/DL — HIGH (ref 70–99)
HCT VFR BLD CALC: 36.9 % — LOW (ref 39–50)
HGB BLD-MCNC: 10.7 G/DL — LOW (ref 13–17)
MAGNESIUM SERPL-MCNC: 1.8 MG/DL — SIGNIFICANT CHANGE UP (ref 1.6–2.6)
MCHC RBC-ENTMCNC: 29 GM/DL — LOW (ref 32–36)
MCHC RBC-ENTMCNC: 29.1 PG — SIGNIFICANT CHANGE UP (ref 27–34)
MCV RBC AUTO: 100.3 FL — HIGH (ref 80–100)
PLATELET # BLD AUTO: 152 K/UL — SIGNIFICANT CHANGE UP (ref 150–400)
POTASSIUM SERPL-MCNC: 4.4 MMOL/L — SIGNIFICANT CHANGE UP (ref 3.5–5.3)
POTASSIUM SERPL-SCNC: 4.4 MMOL/L — SIGNIFICANT CHANGE UP (ref 3.5–5.3)
RBC # BLD: 3.68 M/UL — LOW (ref 4.2–5.8)
RBC # FLD: 17.3 % — HIGH (ref 10.3–14.5)
SODIUM SERPL-SCNC: 145 MMOL/L — SIGNIFICANT CHANGE UP (ref 135–145)
WBC # BLD: 5.81 K/UL — SIGNIFICANT CHANGE UP (ref 3.8–10.5)
WBC # FLD AUTO: 5.81 K/UL — SIGNIFICANT CHANGE UP (ref 3.8–10.5)

## 2022-03-12 PROCEDURE — 99233 SBSQ HOSP IP/OBS HIGH 50: CPT

## 2022-03-12 PROCEDURE — 99497 ADVNCD CARE PLAN 30 MIN: CPT

## 2022-03-12 RX ORDER — ALPRAZOLAM 0.25 MG
0.5 TABLET ORAL AT BEDTIME
Refills: 0 | Status: DISCONTINUED | OUTPATIENT
Start: 2022-03-12 | End: 2022-03-13

## 2022-03-12 RX ADMIN — Medication 1: at 12:44

## 2022-03-12 RX ADMIN — Medication 0.5 MILLIGRAM(S): at 23:05

## 2022-03-12 RX ADMIN — OXYCODONE HYDROCHLORIDE 5 MILLIGRAM(S): 5 TABLET ORAL at 12:43

## 2022-03-12 RX ADMIN — Medication 25 MILLIGRAM(S): at 21:49

## 2022-03-12 RX ADMIN — Medication 25 MILLIGRAM(S): at 08:50

## 2022-03-12 RX ADMIN — Medication 60 MILLIGRAM(S): at 18:15

## 2022-03-12 RX ADMIN — POLYETHYLENE GLYCOL 3350 17 GRAM(S): 17 POWDER, FOR SOLUTION ORAL at 08:50

## 2022-03-12 RX ADMIN — OXYCODONE HYDROCHLORIDE 5 MILLIGRAM(S): 5 TABLET ORAL at 19:14

## 2022-03-12 RX ADMIN — TAMSULOSIN HYDROCHLORIDE 0.4 MILLIGRAM(S): 0.4 CAPSULE ORAL at 21:48

## 2022-03-12 RX ADMIN — Medication 1: at 18:14

## 2022-03-12 RX ADMIN — Medication 25 MILLIGRAM(S): at 16:08

## 2022-03-12 RX ADMIN — OXYCODONE HYDROCHLORIDE 5 MILLIGRAM(S): 5 TABLET ORAL at 19:30

## 2022-03-12 RX ADMIN — PREGABALIN 1000 MICROGRAM(S): 225 CAPSULE ORAL at 08:50

## 2022-03-12 RX ADMIN — ISOSORBIDE MONONITRATE 30 MILLIGRAM(S): 60 TABLET, EXTENDED RELEASE ORAL at 12:44

## 2022-03-12 RX ADMIN — OXYCODONE HYDROCHLORIDE 5 MILLIGRAM(S): 5 TABLET ORAL at 14:02

## 2022-03-12 RX ADMIN — Medication 1 MILLIGRAM(S): at 08:50

## 2022-03-12 RX ADMIN — Medication 60 MILLIGRAM(S): at 05:45

## 2022-03-12 RX ADMIN — Medication 300 MICROGRAM(S): at 05:46

## 2022-03-12 RX ADMIN — NYSTATIN CREAM 1 APPLICATION(S): 100000 CREAM TOPICAL at 16:08

## 2022-03-12 RX ADMIN — Medication 1000 UNIT(S): at 08:50

## 2022-03-12 NOTE — PROGRESS NOTE ADULT - SUBJECTIVE AND OBJECTIVE BOX
[facility]                                   [address] [place] [address]                                                      PHONE: [phone]                                                         FAX: [phone]  [email or web address]  ---------------------------------------------------------------------------------------------------------------------------------    Overnight events/patient complaints: continues to decline NIV at night. awake and alert this am     INTERPRETATION OF TELEMETRY (personally reviewed)    tt< from: TTE Echo Complete w/o Contrast w/ Doppler (03.07.22 @ 11:33) >   1. Left ventricular ejection fraction, by visual estimation, is 50 to 55%.   2. Normal global left ventricular systolic function.   3. Moderatelyincreased LV wall thickness.   4. Moderately enlarged right ventricle. Mildly reduced RV systolic function.   5. Right ventricular volume overload.   6. Moderate biatrial enlargement.   7. Moderate tricuspid regurgitation.   8. Estimated pulmonary artery systolic pressure is 52.2 mmHg assuming a right atrial pressure of 15 mmHg, which is consistent with moderate pulmonary hypertension.   9. Thickening of the anterior and posterior mitral valve leaflets.  10. Mild mitral annular calcification.  11. Trace mitral valve regurgitation.  12. Dilatation of the ascending aorta, measuring approx 3.67 cm.  13. Sclerotic aortic valve with normal opening.  14. There is no evidence of pericardial effusion.  15. Endocardial visualization was enhancedwith intravenous echo contrast.  16. There are no prior studies on this patient for comparison purposes.    I&O's Summary    11 Mar 2022 07:01  -  12 Mar 2022 07:00  --------------------------------------------------------  IN: 825 mL / OUT: 4180 mL / NET: -3355 mL    12 Mar 2022 06:01  -  12 Mar 2022 10:12  --------------------------------------------------------  IN: 0 mL / OUT: 1200 mL / NET: -1200 mL        PAST MEDICAL & SURGICAL HISTORY:  Chronic obstructive pulmonary disease (COPD)    DM (diabetes mellitus), type 2    HTN (hypertension)    HLD (hyperlipidemia)    Lung nodules    2019 novel coronavirus disease (COVID-19)    History of hernia repair        sulfamethoxazole (Urticaria)    MEDICATIONS  (STANDING):  cholecalciferol 1000 Unit(s) Oral daily  cyanocobalamin 1000 MICROGram(s) Oral daily  dextrose 40% Gel 15 Gram(s) Oral once  dextrose 5%. 1000 milliLiter(s) (50 mL/Hr) IV Continuous <Continuous>  dextrose 5%. 1000 milliLiter(s) (100 mL/Hr) IV Continuous <Continuous>  dextrose 50% Injectable 25 Gram(s) IV Push once  dextrose 50% Injectable 12.5 Gram(s) IV Push once  dextrose 50% Injectable 25 Gram(s) IV Push once  fluticasone furoate/umeclidinium/vilanterol 100-62.5-25 MICROgram(s) Inhaler 1 Puff(s) Inhalation daily  folic acid 1 milliGRAM(s) Oral daily  furosemide   Injectable 60 milliGRAM(s) IV Push every 12 hours  glucagon  Injectable 1 milliGRAM(s) IntraMuscular once  hydrALAZINE 25 milliGRAM(s) Oral three times a day  insulin lispro (ADMELOG) corrective regimen sliding scale   SubCutaneous three times a day before meals  insulin lispro (ADMELOG) corrective regimen sliding scale   SubCutaneous at bedtime  isosorbide   mononitrate ER Tablet (IMDUR) 30 milliGRAM(s) Oral daily  levothyroxine 300 MICROGram(s) Oral daily  metoprolol tartrate 25 milliGRAM(s) Oral every 8 hours  nystatin Powder 1 Application(s) Topical three times a day  polyethylene glycol 3350 17 Gram(s) Oral daily  senna 2 Tablet(s) Oral at bedtime  tamsulosin 0.4 milliGRAM(s) Oral at bedtime    MEDICATIONS  (PRN):  acetaminophen     Tablet .. 650 milliGRAM(s) Oral every 6 hours PRN Temp greater or equal to 38C (100.4F), Mild Pain (1 - 3), Moderate Pain (4 - 6)  albuterol/ipratropium for Nebulization 3 milliLiter(s) Nebulizer every 6 hours PRN Shortness of Breath and/or Wheezing  ondansetron Injectable 4 milliGRAM(s) IV Push every 6 hours PRN Nausea and/or Vomiting  oxyCODONE    IR 5 milliGRAM(s) Oral every 6 hours PRN Severe Pain (7 - 10)      Vital Signs Last 24 Hrs  T(C): 36.2 (12 Mar 2022 08:00), Max: 36.9 (12 Mar 2022 04:38)  T(F): 97.1 (12 Mar 2022 08:00), Max: 98.4 (12 Mar 2022 04:38)  HR: 64 (12 Mar 2022 08:00) (57 - 74)  BP: 132/47 (12 Mar 2022 08:00) (99/40 - 132/47)  BP(mean): 67 (12 Mar 2022 08:00) (67 - 67)  RR: 13 (12 Mar 2022 08:00) (13 - 20)  SpO2: 93% (12 Mar 2022 08:00) (93% - 100%)  ICU Vital Signs Last 24 Hrs    PHYSICAL EXAM:  General: morbidly obese, chronically ill appearing   HEENT: Head; normocephalic, atraumatic. Pupils reactive, cornea wnl.  CARDIOVASCULAR: Normal S1 and S2, 2/6 STACIA, no rub, gallop or lift.   LUNGS: decreased breath sounds at the bases, poor effort  ABDOMEN: Soft, nontender without mass or organomegaly. bowel sounds normoactive.  EXTREMITIES: No clubbing, cyanosis, (+) LE edema.   SKIN: warm and dry with normal turgor.  NEURO: Alert/oriented x 3/normal motor exam.   PSYCH: normal affect.              LABS:                        10.7   5.81  )-----------( 152      ( 12 Mar 2022 07:54 )             36.9     03-12    145  |  97<L>  |  33.2<H>  ----------------------------<  121<H>  4.4   |  42.0<H>  |  1.18    Ca    9.0      12 Mar 2022 07:54  Mg     1.8     03-12      ASSESSMENT AND PLAN:  Patient is a morbidly obese former smoker man with COPD, COVID 19 c/b respiratory failure with prolonged hospitalization, discharge on home O2 and now with chronic respiratory failure requiring 4L O2, hypertension, suspected lymphedema, who has had increasingly sedentary lifestyle since his last hospitalization who presents with dyspnea, increased edema and scrotal edema, found to have acute on chronic hypoxic respiratory failure     Acute on chronic hypoxic respiratory failure/hypervolemia/NSVT  - continue BiPAP support as tolerated, consider high flow  - continue lasix 60mg IV   - continue imdur with hydralazine 25mg TID for further afterload reduction  - continue metoprolol   - strict I/O and daily weight    - TTE reviewed   - daily renal indices, Cr noted, avoid nephrotoxins  - ongoing [facility]    Thank you for allowing [facility] to participate in the care of this patient.  Please feel free to call with any questions or concerns.

## 2022-03-12 NOTE — PROGRESS NOTE ADULT - SUBJECTIVE AND OBJECTIVE BOX
CC: Scrotal swelling (11 Mar 2022 11:57)    INTERVAL HPI/OVERNIGHT EVENTS:\  no acute events overnight  patient when eating and on 6 LPM NC drops to 85% but no acute compalints  not compliant with CPAP at night - removed due to discomfort  agrees for sleep aid    Vital Signs Last 24 Hrs  T(C): 36.2 (12 Mar 2022 08:00), Max: 36.9 (12 Mar 2022 04:38)  T(F): 97.1 (12 Mar 2022 08:00), Max: 98.4 (12 Mar 2022 04:38)  HR: 64 (12 Mar 2022 08:00) (57 - 74)  BP: 132/47 (12 Mar 2022 08:00) (99/40 - 132/47)  BP(mean): 67 (12 Mar 2022 08:00) (67 - 67)  RR: 13 (12 Mar 2022 08:00) (13 - 20)  SpO2: 93% (12 Mar 2022 08:00) (93% - 100%)    PHYSICAL EXAM:  General: in no acute distress; morbidly obese  Eyes: PERRLA, EOMI; conjunctiva and sclera clear  Head: Normocephalic; atraumatic  ENMT: No nasal discharge; airway clear  Neck: Supple; non tender; no masses  Respiratory: No wheezes, rales or rhonchi  Cardiovascular: Regular rate and rhythm. S1 and S2 Normal; No murmurs, gallops or rubs  Gastrointestinal: Soft non-tender non-distended; Normal bowel sounds  Extremities: Normal range of motion, No clubbing, cyanosis; significant pitting edema to the level of the knee; no breakage in skin; SCD on  Vascular: Peripheral pulses palpable 2+ bilaterally  Neurological: Alert and oriented x4  Skin: Warm and dry. No acute rash  Psychiatric: Cooperative and appropriate    I&O's Detail    11 Mar 2022 07:01  -  12 Mar 2022 07:00  --------------------------------------------------------  IN:    Oral Fluid: 825 mL  Total IN: 825 mL    OUT:    Indwelling Catheter - Urethral (mL): 4180 mL  Total OUT: 4180 mL    Total NET: -3355 mL      12 Mar 2022 06:01  -  12 Mar 2022 10:12  --------------------------------------------------------  IN:  Total IN: 0 mL    OUT:    Indwelling Catheter - Urethral (mL): 1200 mL  Total OUT: 1200 mL    Total NET: -1200 mL                        10.7   5.81  )-----------( 152      ( 12 Mar 2022 07:54 )             36.9     12 Mar 2022 07:54    145    |  97     |  33.2   ----------------------------<  121    4.4     |  42.0   |  1.18     Ca    9.0        12 Mar 2022 07:54  Mg     1.8       12 Mar 2022 07:54    CAPILLARY BLOOD GLUCOSE  POCT Blood Glucose.: 120 mg/dL (12 Mar 2022 09:00)  POCT Blood Glucose.: 133 mg/dL (11 Mar 2022 22:08)  POCT Blood Glucose.: 130 mg/dL (11 Mar 2022 16:27)  POCT Blood Glucose.: 177 mg/dL (11 Mar 2022 12:25)    MEDICATIONS  (STANDING):  cholecalciferol 1000 Unit(s) Oral daily  cyanocobalamin 1000 MICROGram(s) Oral daily  dextrose 40% Gel 15 Gram(s) Oral once  dextrose 5%. 1000 milliLiter(s) (50 mL/Hr) IV Continuous <Continuous>  dextrose 5%. 1000 milliLiter(s) (100 mL/Hr) IV Continuous <Continuous>  dextrose 50% Injectable 25 Gram(s) IV Push once  dextrose 50% Injectable 12.5 Gram(s) IV Push once  dextrose 50% Injectable 25 Gram(s) IV Push once  fluticasone furoate/umeclidinium/vilanterol 100-62.5-25 MICROgram(s) Inhaler 1 Puff(s) Inhalation daily  folic acid 1 milliGRAM(s) Oral daily  furosemide   Injectable 60 milliGRAM(s) IV Push every 12 hours  glucagon  Injectable 1 milliGRAM(s) IntraMuscular once  hydrALAZINE 25 milliGRAM(s) Oral three times a day  insulin lispro (ADMELOG) corrective regimen sliding scale   SubCutaneous three times a day before meals  insulin lispro (ADMELOG) corrective regimen sliding scale   SubCutaneous at bedtime  isosorbide   mononitrate ER Tablet (IMDUR) 30 milliGRAM(s) Oral daily  levothyroxine 300 MICROGram(s) Oral daily  metoprolol tartrate 25 milliGRAM(s) Oral every 8 hours  nystatin Powder 1 Application(s) Topical three times a day  polyethylene glycol 3350 17 Gram(s) Oral daily  senna 2 Tablet(s) Oral at bedtime  tamsulosin 0.4 milliGRAM(s) Oral at bedtime    MEDICATIONS  (PRN):  acetaminophen     Tablet .. 650 milliGRAM(s) Oral every 6 hours PRN Temp greater or equal to 38C (100.4F), Mild Pain (1 - 3), Moderate Pain (4 - 6)  albuterol/ipratropium for Nebulization 3 milliLiter(s) Nebulizer every 6 hours PRN Shortness of Breath and/or Wheezing  ondansetron Injectable 4 milliGRAM(s) IV Push every 6 hours PRN Nausea and/or Vomiting  oxyCODONE    IR 5 milliGRAM(s) Oral every 6 hours PRN Severe Pain (7 - 10)      RADIOLOGY & ADDITIONAL TESTS:

## 2022-03-12 NOTE — PROGRESS NOTE ADULT - ASSESSMENT
81 year old male with history of Lymphedema, Morbid Obesity, COPD on 4LNC, Smoking (stopped 3 years ago), Lung Nodules, HTN, Hypothyroidism, RA and COVID-19 brought by daughter with hypoxia and scrotal swelling. In ER, he was found to be hypoxic to 88%, supplemental oxygen was increased to 5LNC. He was noted to have anasarca and was given a dose of Lasix 40 mg x 1.     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 Patient is DNR/DNI  - will add xanax at night for sleep  - Symbicort and Spiriva (takes Trelegy at home)  - DuoNeb PRN  - Pulmonary consulted, rec to continue AVAPs  - O2 sats 88% and above acceptable     Right Pleural Effusion   - Lasix as above  - Consider thoracocentesis if no improvement     Scrotal Swelling  - Likely due to above  - Scrotal US wnl  - Continue Lasix  - Urology consulted    Left Renal Lesion (suspected RCC)   Hematuria, improved  - Dr. Marley Discussed with patient, he denies any history in past   - Needs further work up by Urology   - hold heparin    Renal Insufficiency possible CKD type II, stable  - Unknowns baseline renal function   - Avoid nephrotoxic medications  - Monitor renal function     BPH  - Shukla's catheter was placed in ER  - Flomax     Hypomagnesemia  - replaced     HTN  - Continue Metoprolol    DM 2  - HbA1c 6.9%  - Accu checks and ISS    Hypothyroidism  - Continue Synthroid     Rheumatoid Arthritis  - Gets weekly Methotrexate (on Friday), will confirm dose with [facility]  - Will avoid NSAIDs due to renal insufficiency  - Oxycodone PRN    DVT Prophylaxis -- Heparin SQ.   DIspo: patient requiring step down level of care. No plans for dc.   Advance Directives: DNR/IPartha CABALLERO in alpha.

## 2022-03-13 LAB
ANION GAP SERPL CALC-SCNC: 7 MMOL/L — SIGNIFICANT CHANGE UP (ref 5–17)
ANISOCYTOSIS BLD QL: SLIGHT — SIGNIFICANT CHANGE UP
BASOPHILS # BLD AUTO: 0.11 K/UL — SIGNIFICANT CHANGE UP (ref 0–0.2)
BASOPHILS NFR BLD AUTO: 1.8 % — SIGNIFICANT CHANGE UP (ref 0–2)
BUN SERPL-MCNC: 28.1 MG/DL — HIGH (ref 8–20)
CALCIUM SERPL-MCNC: 8.4 MG/DL — LOW (ref 8.6–10.2)
CHLORIDE SERPL-SCNC: 94 MMOL/L — LOW (ref 98–107)
CO2 SERPL-SCNC: 44 MMOL/L — HIGH (ref 22–29)
CREAT SERPL-MCNC: 0.95 MG/DL — SIGNIFICANT CHANGE UP (ref 0.5–1.3)
EGFR: 81 ML/MIN/1.73M2 — SIGNIFICANT CHANGE UP
EOSINOPHIL # BLD AUTO: 0.1 K/UL — SIGNIFICANT CHANGE UP (ref 0–0.5)
EOSINOPHIL NFR BLD AUTO: 1.7 % — SIGNIFICANT CHANGE UP (ref 0–6)
GIANT PLATELETS BLD QL SMEAR: PRESENT — SIGNIFICANT CHANGE UP
GLUCOSE BLDC GLUCOMTR-MCNC: 124 MG/DL — HIGH (ref 70–99)
GLUCOSE SERPL-MCNC: 125 MG/DL — HIGH (ref 70–99)
HCT VFR BLD CALC: 35.8 % — LOW (ref 39–50)
HGB BLD-MCNC: 10.4 G/DL — LOW (ref 13–17)
LYMPHOCYTES # BLD AUTO: 0.36 K/UL — LOW (ref 1–3.3)
LYMPHOCYTES # BLD AUTO: 6.1 % — LOW (ref 13–44)
MACROCYTES BLD QL: SLIGHT — SIGNIFICANT CHANGE UP
MANUAL SMEAR VERIFICATION: SIGNIFICANT CHANGE UP
MCHC RBC-ENTMCNC: 29.1 GM/DL — LOW (ref 32–36)
MCHC RBC-ENTMCNC: 29.5 PG — SIGNIFICANT CHANGE UP (ref 27–34)
MCV RBC AUTO: 101.4 FL — HIGH (ref 80–100)
MICROCYTES BLD QL: SLIGHT — SIGNIFICANT CHANGE UP
MONOCYTES # BLD AUTO: 0.92 K/UL — HIGH (ref 0–0.9)
MONOCYTES NFR BLD AUTO: 15.8 % — HIGH (ref 2–14)
NEUTROPHILS # BLD AUTO: 4.36 K/UL — SIGNIFICANT CHANGE UP (ref 1.8–7.4)
NEUTROPHILS NFR BLD AUTO: 74.6 % — SIGNIFICANT CHANGE UP (ref 43–77)
PLAT MORPH BLD: NORMAL — SIGNIFICANT CHANGE UP
PLATELET # BLD AUTO: 137 K/UL — LOW (ref 150–400)
PLATELET CLUMP BLD QL SMEAR: SLIGHT
PLATELET COUNT - ESTIMATE: ABNORMAL
POLYCHROMASIA BLD QL SMEAR: SLIGHT — SIGNIFICANT CHANGE UP
POTASSIUM SERPL-MCNC: 4.2 MMOL/L — SIGNIFICANT CHANGE UP (ref 3.5–5.3)
POTASSIUM SERPL-SCNC: 4.2 MMOL/L — SIGNIFICANT CHANGE UP (ref 3.5–5.3)
RBC # BLD: 3.53 M/UL — LOW (ref 4.2–5.8)
RBC # FLD: 17.1 % — HIGH (ref 10.3–14.5)
RBC BLD AUTO: NORMAL — SIGNIFICANT CHANGE UP
SARS-COV-2 RNA SPEC QL NAA+PROBE: SIGNIFICANT CHANGE UP
SODIUM SERPL-SCNC: 145 MMOL/L — SIGNIFICANT CHANGE UP (ref 135–145)
WBC # BLD: 5.85 K/UL — SIGNIFICANT CHANGE UP (ref 3.8–10.5)
WBC # FLD AUTO: 5.85 K/UL — SIGNIFICANT CHANGE UP (ref 3.8–10.5)

## 2022-03-13 PROCEDURE — 99233 SBSQ HOSP IP/OBS HIGH 50: CPT

## 2022-03-13 PROCEDURE — 99497 ADVNCD CARE PLAN 30 MIN: CPT

## 2022-03-13 RX ORDER — MORPHINE SULFATE 50 MG/1
2 CAPSULE, EXTENDED RELEASE ORAL ONCE
Refills: 0 | Status: DISCONTINUED | OUTPATIENT
Start: 2022-03-13 | End: 2022-03-13

## 2022-03-13 RX ORDER — OXYCODONE HYDROCHLORIDE 5 MG/1
10 TABLET ORAL EVERY 4 HOURS
Refills: 0 | Status: DISCONTINUED | OUTPATIENT
Start: 2022-03-13 | End: 2022-03-16

## 2022-03-13 RX ORDER — ALPRAZOLAM 0.25 MG
0.5 TABLET ORAL AT BEDTIME
Refills: 0 | Status: DISCONTINUED | OUTPATIENT
Start: 2022-03-13 | End: 2022-03-13

## 2022-03-13 RX ORDER — BUMETANIDE 0.25 MG/ML
2 INJECTION INTRAMUSCULAR; INTRAVENOUS
Refills: 0 | Status: DISCONTINUED | OUTPATIENT
Start: 2022-03-13 | End: 2022-03-13

## 2022-03-13 RX ORDER — OXYCODONE HYDROCHLORIDE 5 MG/1
5 TABLET ORAL EVERY 4 HOURS
Refills: 0 | Status: DISCONTINUED | OUTPATIENT
Start: 2022-03-13 | End: 2022-03-18

## 2022-03-13 RX ORDER — TAMSULOSIN HYDROCHLORIDE 0.4 MG/1
0.8 CAPSULE ORAL AT BEDTIME
Refills: 0 | Status: DISCONTINUED | OUTPATIENT
Start: 2022-03-13 | End: 2022-03-17

## 2022-03-13 RX ADMIN — Medication 1000 UNIT(S): at 10:38

## 2022-03-13 RX ADMIN — Medication 1 MILLIGRAM(S): at 10:39

## 2022-03-13 RX ADMIN — PREGABALIN 1000 MICROGRAM(S): 225 CAPSULE ORAL at 10:39

## 2022-03-13 RX ADMIN — Medication 300 MICROGRAM(S): at 05:32

## 2022-03-13 RX ADMIN — Medication 25 MILLIGRAM(S): at 05:32

## 2022-03-13 RX ADMIN — OXYCODONE HYDROCHLORIDE 10 MILLIGRAM(S): 5 TABLET ORAL at 16:49

## 2022-03-13 RX ADMIN — OXYCODONE HYDROCHLORIDE 10 MILLIGRAM(S): 5 TABLET ORAL at 13:12

## 2022-03-13 RX ADMIN — Medication 60 MILLIGRAM(S): at 05:33

## 2022-03-13 NOTE — PROGRESS NOTE ADULT - ASSESSMENT
81 year old male with history of Lymphedema, Morbid Obesity, COPD on 4LNC, Smoking (stopped 3 years ago), Lung Nodules, HTN, Hypothyroidism, RA and COVID-19 brought by daughter with hypoxia and scrotal swelling. In ER, he was found to be hypoxic to 88%, supplemental oxygen was increased to 5LNC. He was noted to have anasarca and was given a dose of Lasix 40 mg x 1.     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 Patient is DNR/DNI  - will add xanax at night for sleep - appears to be tolerating  - Symbicort and Spiriva (takes Trelegy at home)  - DuoNeb PRN  - Pulmonary consulted, rec to continue AVAPs  - O2 sats 88% and above acceptable     Right Pleural Effusion   - Lasix as above  - Consider thoracocentesis if no improvement     Scrotal Swelling  - Likely due to above  - Scrotal US wnl  - Continue Lasix  - Urology consulted    Left Renal Lesion (suspected RCC)   Hematuria, improved  - Dr. Marley Discussed with patient, he denies any history in past   - Needs further work up by Urology   - hold heparin    Renal Insufficiency possible CKD type II, stable  - Unknowns baseline renal function   - Avoid nephrotoxic medications  - Monitor renal function     BPH  - Shukla's catheter was placed in ER  - Flomax     Hypomagnesemia  - replaced     HTN  - Continue Metoprolol    DM 2  - HbA1c 6.9%  - Accu checks and ISS    Hypothyroidism  - Continue Synthroid     Rheumatoid Arthritis  - Gets weekly Methotrexate (on Friday), will [facility] dose with [facility]  - Will avoid NSAIDs due to renal insufficiency  - Oxycodone PRN    DVT Prophylaxis -- Heparin SQ.   DIspo: patient requiring step down level of care. No plans for dc.   Advance Directives: DNR/IPartha CABALLERO in alpha. 81 year old male with history of Lymphedema, Morbid Obesity, COPD on 4LNC, Smoking (stopped 3 years ago), Lung Nodules, HTN, Hypothyroidism, RA and COVID-19 brought by daughter with hypoxia and scrotal swelling. In ER, he was found to be hypoxic to 88%, supplemental oxygen was increased to 5LNC. He was noted to have anasarca and was given a dose of Lasix 40 mg x 1.     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 Patient is DNR/DNI  - attempted to add xanax at night but patient still refusing AVAPS intermittently  - wore for a few hours overnight  - Symbicort and Spiriva (takes Trelegy at home)  - DuoNeb PRN  - Pulmonary consulted, rec to continue AVAPs  - O2 sats 88% and above acceptable     Right Pleural Effusion   - Lasix as above  - Consider thoracocentesis if no improvement     Scrotal Swelling  - Likely due to above  - Scrotal US wnl  - Continue Lasix  - Urology consulted    Left Renal Lesion (suspected RCC)   Hematuria, improved  - Dr. Marley Discussed with patient, he denies any history in past   - Needs further work up by Urology   - hold heparin    Renal Insufficiency possible CKD type II, stable  - Unknowns baseline renal function   - Avoid nephrotoxic medications  - Monitor renal function     BPH  - Shukla's catheter was placed in ER  - Flomax     Hypomagnesemia  - replaced     HTN  - Continue Metoprolol    DM 2 with neuropathy  - HbA1c 6.9%  - Accu checks and ISS    Hypothyroidism  - Continue Synthroid     Rheumatoid Arthritis  - Gets weekly Methotrexate (on Friday), will [facility] dose [facility]  - Will avoid NSAIDs due to renal insufficiency  - Oxycodone PRN - patient taking regularly however    DVT Prophylaxis -- Heparin SQ.   DIspo: can transfer to telemetry; prognosis guarded  Advance Directives: DNR/I. ADA in alpha.

## 2022-03-13 NOTE — PROGRESS NOTE ADULT - SUBJECTIVE AND OBJECTIVE BOX
CC: Scrotal swelling (12 Mar 2022 10:12)    INTERVAL HPI/OVERNIGHT EVENTS:  tolerated bipap overnight  without acute events    Vital Signs Last 24 Hrs  T(C): 36.3 (13 Mar 2022 08:00), Max: 36.9 (12 Mar 2022 16:00)  T(F): 97.3 (13 Mar 2022 08:00), Max: 98.4 (12 Mar 2022 16:00)  HR: 64 (13 Mar 2022 08:00) (54 - 67)  BP: 126/44 (13 Mar 2022 08:00) (124/54 - 149/54)  BP(mean): 65 (13 Mar 2022 08:00) (65 - 78)  RR: 18 (13 Mar 2022 08:00) (12 - 18)  SpO2: 93% (13 Mar 2022 08:00) (93% - 99%)    PHYSICAL EXAM:  General: in no acute distress; morbidly obese  Eyes: PERRLA, EOMI; conjunctiva and sclera clear  Head: Normocephalic; atraumatic  ENMT: No nasal discharge; airway clear  Neck: Supple; non tender; no masses  Respiratory: No wheezes, rales or rhonchi  Cardiovascular: Regular rate and rhythm. S1 and S2 Normal; No murmurs, gallops or rubs  Gastrointestinal: Soft non-tender non-distended; Normal bowel sounds  Extremities: Normal range of motion, No clubbing, cyanosis; significant pitting edema to the level of the knee; no breakage in skin; SCD on  Vascular: Peripheral pulses palpable 2+ bilaterally  Neurological: Alert and oriented x4  Skin: Warm and dry. No acute rash  Psychiatric: Cooperative and appropriate    I&O's Detail    12 Mar 2022 06:01  -  13 Mar 2022 07:00  --------------------------------------------------------  IN:    IV PiggyBack: 1700 mL  Total IN: 1700 mL    OUT:    Indwelling Catheter - Urethral (mL): 1200 mL  Total OUT: 1200 mL    Total NET: 500 mL                        10.7   5.81  )-----------( 152      ( 12 Mar 2022 07:54 )             36.9     12 Mar 2022 07:54    145    |  97     |  33.2   ----------------------------<  121    4.4     |  42.0   |  1.18     Ca    9.0        12 Mar 2022 07:54  Mg     1.8       12 Mar 2022 07:54    CAPILLARY BLOOD GLUCOSE  POCT Blood Glucose.: 124 mg/dL (13 Mar 2022 07:49)  POCT Blood Glucose.: 154 mg/dL (12 Mar 2022 21:48)  POCT Blood Glucose.: 154 mg/dL (12 Mar 2022 17:57)  POCT Blood Glucose.: 161 mg/dL (12 Mar 2022 12:26)  POCT Blood Glucose.: 120 mg/dL (12 Mar 2022 09:00)    MEDICATIONS  (STANDING):  ALPRAZolam 0.5 milliGRAM(s) Oral at bedtime  cholecalciferol 1000 Unit(s) Oral daily  cyanocobalamin 1000 MICROGram(s) Oral daily  dextrose 40% Gel 15 Gram(s) Oral once  dextrose 5%. 1000 milliLiter(s) (50 mL/Hr) IV Continuous <Continuous>  dextrose 5%. 1000 milliLiter(s) (100 mL/Hr) IV Continuous <Continuous>  dextrose 50% Injectable 25 Gram(s) IV Push once  dextrose 50% Injectable 12.5 Gram(s) IV Push once  dextrose 50% Injectable 25 Gram(s) IV Push once  fluticasone furoate/umeclidinium/vilanterol 100-62.5-25 MICROgram(s) Inhaler 1 Puff(s) Inhalation daily  folic acid 1 milliGRAM(s) Oral daily  furosemide   Injectable 60 milliGRAM(s) IV Push every 12 hours  glucagon  Injectable 1 milliGRAM(s) IntraMuscular once  hydrALAZINE 25 milliGRAM(s) Oral three times a day  insulin lispro (ADMELOG) corrective regimen sliding scale   SubCutaneous three times a day before meals  insulin lispro (ADMELOG) corrective regimen sliding scale   SubCutaneous at bedtime  isosorbide   mononitrate ER Tablet (IMDUR) 30 milliGRAM(s) Oral daily  levothyroxine 300 MICROGram(s) Oral daily  metoprolol tartrate 25 milliGRAM(s) Oral every 8 hours  nystatin Powder 1 Application(s) Topical three times a day  polyethylene glycol 3350 17 Gram(s) Oral daily  senna 2 Tablet(s) Oral at bedtime  tamsulosin 0.4 milliGRAM(s) Oral at bedtime    MEDICATIONS  (PRN):  acetaminophen     Tablet .. 650 milliGRAM(s) Oral every 6 hours PRN Temp greater or equal to 38C (100.4F), Mild Pain (1 - 3), Moderate Pain (4 - 6)  albuterol/ipratropium for Nebulization 3 milliLiter(s) Nebulizer every 6 hours PRN Shortness of Breath and/or Wheezing  ondansetron Injectable 4 milliGRAM(s) IV Push every 6 hours PRN Nausea and/or Vomiting  oxyCODONE    IR 5 milliGRAM(s) Oral every 6 hours PRN Severe Pain (7 - 10)      RADIOLOGY & ADDITIONAL TESTS:

## 2022-03-13 NOTE — GOALS OF CARE CONVERSATION - ADVANCED CARE PLANNING - NS PRO AD PATIENT TYPE
Health Care Proxy (HCP)/Do Not Resuscitate (DNR)
Do Not Resuscitate (DNR)/Medical Orders for Life-Sustaining Treatment (MOLST)

## 2022-03-13 NOTE — PROGRESS NOTE ADULT - SUBJECTIVE AND OBJECTIVE BOX
[facility], [facility]                                   [address] [place] [address]                                                      PHONE: [phone]                                                         FAX: [phone]  [email or web address]  ---------------------------------------------------------------------------------------------------------------------------------    Overnight events/patient complaints: Intermittently compliant with BiPAP. Awake and alert his am.     INTERPRETATION OF TELEMETRY (personally reviewed)    < from: TTE Echo Complete w/o Contrast w/ Doppler (03.07.22 @ 11:33) >   1. Left ventricular ejection fraction, by visual estimation, is 50 to 55%.   2. Normal global left ventricular systolic function.   3. Moderatelyincreased LV wall thickness.   4. Moderately enlarged right ventricle. Mildly reduced RV systolic function.   5. Right ventricular volume overload.   6. Moderate biatrial enlargement.   7. Moderate tricuspid regurgitation.   8. Estimated pulmonary artery systolic pressure is 52.2 mmHg assuming a right atrial pressure of 15 mmHg, which is consistent with moderate pulmonary hypertension.   9. Thickening of the anterior and posterior mitral valve leaflets.  10. Mild mitral annular calcification.  11. Trace mitral valve regurgitation.  12. Dilatation of the ascending aorta, measuring approx 3.67 cm.  13. Sclerotic aortic valve with normal opening.  14. There is no evidence of pericardial effusion.  15. Endocardial visualization was enhancedwith intravenous echo contrast.  16. There are no prior studies on this patient for comparison purposes.      I&O's Summary    12 Mar 2022 06:01  -  13 Mar 2022 07:00  --------------------------------------------------------  IN: 1700 mL / OUT: 1200 mL / NET: 500 mL        PAST MEDICAL & SURGICAL HISTORY:  Chronic obstructive pulmonary disease (COPD)    DM (diabetes mellitus), type 2    HTN (hypertension)    HLD (hyperlipidemia)    Lung nodules    2019 novel coronavirus disease (COVID-19)    History of hernia repair        sulfamethoxazole (Urticaria)    MEDICATIONS  (STANDING):  buMETAnide Injectable 2 milliGRAM(s) IV Push two times a day  cholecalciferol 1000 Unit(s) Oral daily  cyanocobalamin 1000 MICROGram(s) Oral daily  dextrose 40% Gel 15 Gram(s) Oral once  dextrose 5%. 1000 milliLiter(s) (50 mL/Hr) IV Continuous <Continuous>  dextrose 5%. 1000 milliLiter(s) (100 mL/Hr) IV Continuous <Continuous>  dextrose 50% Injectable 25 Gram(s) IV Push once  dextrose 50% Injectable 12.5 Gram(s) IV Push once  dextrose 50% Injectable 25 Gram(s) IV Push once  fluticasone furoate/umeclidinium/vilanterol 100-62.5-25 MICROgram(s) Inhaler 1 Puff(s) Inhalation daily  folic acid 1 milliGRAM(s) Oral daily  glucagon  Injectable 1 milliGRAM(s) IntraMuscular once  hydrALAZINE 25 milliGRAM(s) Oral three times a day  insulin lispro (ADMELOG) corrective regimen sliding scale   SubCutaneous three times a day before meals  insulin lispro (ADMELOG) corrective regimen sliding scale   SubCutaneous at bedtime  isosorbide   mononitrate ER Tablet (IMDUR) 30 milliGRAM(s) Oral daily  levothyroxine 300 MICROGram(s) Oral daily  metoprolol tartrate 25 milliGRAM(s) Oral every 8 hours  nystatin Powder 1 Application(s) Topical three times a day  polyethylene glycol 3350 17 Gram(s) Oral daily  senna 2 Tablet(s) Oral at bedtime  tamsulosin 0.4 milliGRAM(s) Oral at bedtime    MEDICATIONS  (PRN):  acetaminophen     Tablet .. 650 milliGRAM(s) Oral every 6 hours PRN Temp greater or equal to 38C (100.4F), Mild Pain (1 - 3), Moderate Pain (4 - 6)  albuterol/ipratropium for Nebulization 3 milliLiter(s) Nebulizer every 6 hours PRN Shortness of Breath and/or Wheezing  ondansetron Injectable 4 milliGRAM(s) IV Push every 6 hours PRN Nausea and/or Vomiting  oxyCODONE    IR 5 milliGRAM(s) Oral every 6 hours PRN Severe Pain (7 - 10)      Vital Signs Last 24 Hrs  T(C): 36.3 (13 Mar 2022 08:00), Max: 36.9 (12 Mar 2022 16:00)  T(F): 97.3 (13 Mar 2022 08:00), Max: 98.4 (12 Mar 2022 16:00)  HR: 64 (13 Mar 2022 08:00) (54 - 67)  BP: 126/44 (13 Mar 2022 08:00) (124/54 - 149/54)  BP(mean): 65 (13 Mar 2022 08:00) (65 - 78)  RR: 18 (13 Mar 2022 08:00) (12 - 18)  SpO2: 93% (13 Mar 2022 08:00) (93% - 99%)  ICU Vital Signs Last 24 Hrs    PHYSICAL EXAM:  General: obese, chronically ill appearing   HEENT: Head; normocephalic, atraumatic.Pupils reactive, cornea wnl. Neck supple, no nodes adenopathy, no JVD  CARDIOVASCULAR: Normal S1 and S2, 2/6 STACIA, no rub, gallop or lift.   LUNGS: No rales, rhonchi or wheeze. Normal breath sounds bilaterally.  ABDOMEN: Soft, nontender without mass or organomegaly. bowel sounds normoactive.  EXTREMITIES: No clubbing, cyanosis, (+) LE edema.   SKIN: warm and dry with normal turgor.  NEURO: Alert/oriented x 2/normal motor exam.   PSYCH: flat affect         LABS:                        10.4   5.85  )-----------( 137      ( 13 Mar 2022 09:19 )             35.8     03-12    145  |  97<L>  |  33.2<H>  ----------------------------<  121<H>  4.4   |  42.0<H>  |  1.18    Ca    9.0      12 Mar 2022 07:54  Mg     1.8     03-12    ASSESSMENT AND PLAN:  Patient is a morbidly obese former smoker man with COPD, COVID 19 c/b respiratory failure with prolonged hospitalization, discharge on home O2 and now with chronic respiratory failure requiring 4L O2, hypertension, suspected lymphedema, who has had increasingly sedentary lifestyle since his last hospitalization who presents with dyspnea, increased edema and scrotal edema, found to have acute on chronic hypoxic respiratory failure     Acute on chronic hypoxic respiratory failure/hypervolemia/NSVT  - continue BiPAP support as tolerated  - changed to bumex 2mg IV Q12, strict I/O, 2L fluid restriction, CO2 noted   - external compression to the bilateral LE  - continue imdur with hydralazine 25mg TID for further afterload reduction  - continue metoprolol   - TTE reviewed   - daily renal indices, Cr noted, avoid nephrotoxins  - ongoing GOC  - OOB to chair, ambulate as tolerated     Thank you for allowing [facility] to participate in the care of this patient.  Please feel free to call with any questions or concerns.

## 2022-03-14 PROCEDURE — 99497 ADVNCD CARE PLAN 30 MIN: CPT | Mod: 25

## 2022-03-14 PROCEDURE — 99222 1ST HOSP IP/OBS MODERATE 55: CPT

## 2022-03-14 PROCEDURE — 99232 SBSQ HOSP IP/OBS MODERATE 35: CPT

## 2022-03-14 RX ORDER — LEVOTHYROXINE SODIUM 125 MCG
300 TABLET ORAL DAILY
Refills: 0 | Status: DISCONTINUED | OUTPATIENT
Start: 2022-03-14 | End: 2022-03-14

## 2022-03-14 RX ORDER — ALPRAZOLAM 0.25 MG
0.25 TABLET ORAL ONCE
Refills: 0 | Status: DISCONTINUED | OUTPATIENT
Start: 2022-03-14 | End: 2022-03-14

## 2022-03-14 RX ORDER — SENNA PLUS 8.6 MG/1
2 TABLET ORAL AT BEDTIME
Refills: 0 | Status: DISCONTINUED | OUTPATIENT
Start: 2022-03-14 | End: 2022-03-18

## 2022-03-14 RX ORDER — IPRATROPIUM/ALBUTEROL SULFATE 18-103MCG
3 AEROSOL WITH ADAPTER (GRAM) INHALATION EVERY 6 HOURS
Refills: 0 | Status: DISCONTINUED | OUTPATIENT
Start: 2022-03-14 | End: 2022-03-19

## 2022-03-14 RX ADMIN — Medication 0.25 MILLIGRAM(S): at 05:39

## 2022-03-14 RX ADMIN — MORPHINE SULFATE 2 MILLIGRAM(S): 50 CAPSULE, EXTENDED RELEASE ORAL at 00:04

## 2022-03-14 RX ADMIN — OXYCODONE HYDROCHLORIDE 10 MILLIGRAM(S): 5 TABLET ORAL at 17:54

## 2022-03-14 RX ADMIN — OXYCODONE HYDROCHLORIDE 5 MILLIGRAM(S): 5 TABLET ORAL at 05:39

## 2022-03-14 RX ADMIN — OXYCODONE HYDROCHLORIDE 10 MILLIGRAM(S): 5 TABLET ORAL at 18:42

## 2022-03-14 RX ADMIN — OXYCODONE HYDROCHLORIDE 5 MILLIGRAM(S): 5 TABLET ORAL at 06:39

## 2022-03-14 NOTE — PROGRESS NOTE ADULT - ASSESSMENT
81 year old male with history of Lymphedema, Morbid Obesity, COPD on 4LNC, Smoking (stopped 3 years ago), Lung Nodules, HTN, Hypothyroidism, RA and COVID-19 brought by daughter with hypoxia and scrotal swelling. In ER, he was found to be hypoxic to 88%, supplemental oxygen was increased to 5LNC. He was noted to have anasarca and was given a dose of Lasix 40 mg x 1.     1-Anasarca/ Acute on chronic HFpEF (EF 50-55%)/Moderate Pulmonary Hypertension,   fluid overload   overall prognosis poor   DNR/ DNI   pt wants to be kept comfortable   refuses to take cardiac medication , inhaler   per daughter he has not been using the inhaler last 1-2 months   will stop diuretics , all meds   start pain meds as needed   oxygen       2-merced on Chronic hypercapnic respiratory failure due to  COPD and CHF, worsening  on 4L NC at baseline  ABG with co2 78  pt refuses AVAPS   comfort care   neb as needed   ox support    O2 sats 88% and above acceptable     3-R pleural effusion   comfort care     4-Left R  Lesion (suspected RCC)   Hematuria, improved  - Dr. Marley Discussed with patient,   he denies any history in past   - Needs further work up by Urology     5-Renal Insufficiency possible CKD type II, stable  - Unknowns baseline renal function   - Avoid nephrotoxic medications    BPH  - Shukla's catheter was placed in ER  on flomax       HTN  - Continue Metoprolol    DM 2 with neuropathy  - HbA1c 6.9%  - Accu checks and ISS    Hypothyroidism  - Continue Synthroid     Rheumatoid Arthritis  - Gets weekly Methotrexate (on Friday), will [facility]  - Will avoid NSAIDs due to renal insufficiency  - Oxycodone PRN - patient taking regularly however    DVT Prophylaxis -- Heparin SQ.   Dc to NH with opice     GOC with daughter son in law and pt   he can not go home with hospice , agree for NH with comfort hospice   Advance Directives: DNR/IPartha CABLALERO in alpha

## 2022-03-14 NOTE — CHART NOTE - NSCHARTNOTEFT_GEN_A_CORE
Medicine PA-  Pt was made comfort care yesterday, more alert overnight and c/o pain in his ext, ms 2mg given. Pt restless and couldn't sleep, xanax 0.25mg ordered, hospice pending.

## 2022-03-14 NOTE — CONSULT NOTE ADULT - ASSESSMENT
Acute on Chronic Respiratory Failure  Reported on 4-5L NC at home  Reaffirms that he does not want AVAPs device       HFpEF        COPD      BPH    Tamulosin  Pyri      Leg Pain           Acute on Chronic Respiratory Failure  Reported on 4-5L NC at home  Reaffirms that he does not want AVAPs device    HFpEF  Recommend resume cardiac meds as this can promote comfort  If becomes too much of a pill burden with dysphagia and aspiration, then would d/c.    COPD  Reported on 4-5L NC at home      BPH    Tamulosin  Pyri      Leg Pain           Acute on Chronic Respiratory Failure  Reported on 4-5L NC at home  Reaffirms that he does not want AVAPs device    HFpEF  Recommend resume cardiac meds as this can promote comfort  If becomes too much of a pill burden with dysphagia and aspiration, then would d/c.    COPD  Reported on 4-5L NC at home  Albuterol nebulizer reinstated  Would continue inhalers     BPH    cont Tamsulosin    Dysuria  Rec check UA/CS  If patient with urine infection, tx can help symptoms    Leg Pain   Oxycodone 5/10mg for mod/severe pain  Bowel regimen   Cont Senna    Hypothyroid  Cont Levothyroxine    Debility   Assist with care  Lives at home with daughter and son in law and reports needs assistance in his care    Encounter for Palliative Care  Patient Reaffirms he would NOT want any AVAPs devices - he understand without it, it can cause death.    Patient  on Comfort Measures  Recommend reinstating cardiac, COPD meds as this can promote comfort.   Would continue outpatient meds as long as it helps promotes comfort and helps with symptom management.    If it becomes a pill burden, then would d/c    Patient is hospice appropriate given his debility and  advanced COPD  Patient does NOT want to go to a Long Term Facility  Home hospice can be considered  [facility] can also be considered if home hospice thereafter                     Acute on Chronic Respiratory Failure  Reported on 4-5L NC at home  Reaffirms that he does not want AVAPs device    HFpEF  Recommend resume cardiac meds as this can promote comfort  If becomes too much of a pill burden with dysphagia and aspiration, then would d/c.    COPD  Reported on 4-5L NC at home  Albuterol nebulizer reinstated  Would continue inhalers     BPH    cont Tamsulosin    Dysuria  Rec check UA/CS  If patient with urine infection, tx can help symptoms    Leg Pain   Oxycodone 5/10mg for mod/severe pain  Bowel regimen   Cont Senna    Hypothyroid  Cont Levothyroxine    Debility   Assist with care  Lives at home with daughter and son in law and reports needs assistance in his care    Encounter for Palliative Care  Patient Reaffirms he would NOT want any AVAPs devices - he understand without it, it can cause death.    Patient  on Comfort Measures  Recommend reinstating cardiac, COPD meds as this can promote comfort.   Would continue outpatient meds as long as it helps promotes comfort and helps with symptom management.    If it becomes a pill burden, then would d/c    Patient is hospice appropriate given his debility and  advanced COPD  Patient does NOT want to go to a Long Term Facility  Home hospice can be considered  [facility] can also be considered with  home hospice thereafter                     Acute on Chronic Respiratory Failure  Reported on 4-5L NC at home  Reaffirms that he does not want AVAPs device    HFpEF  Recommend resume cardiac meds as this can promote comfort  If becomes too much of a pill burden with dysphagia and aspiration, then would d/c.    COPD  Reported on 4-5L NC at home  Albuterol nebulizer reinstated  Would continue inhalers     BPH    cont Tamsulosin    Dysuria  Rec check UA/CS  If patient with urine infection, tx can help symptoms    Leg Pain   Oxycodone 5/10mg for mod/severe pain  Bowel regimen   Cont Senna    Hypothyroid  Cont Levothyroxine    Debility   Assist with care  Lives at home with daughter and son in law and reports needs assistance in his care    Encounter for Palliative Care  Patient Reaffirms he would NOT want any AVAPs devices - he understand without it, it can cause death.    Patient  on Comfort Measures  Recommend reinstating cardiac, COPD meds as this can promote comfort.   Would continue outpatient meds as long as it helps promotes comfort and helps with symptom management.    If it becomes a pill burden, then would d/c    Patient is hospice appropriate given his debility and  advanced COPD  Patient does NOT want to go to a Long Term Facility  Home hospice can be considered  [facility] can also be considered with  home hospice thereafter    Spoke to family - see [facility]  Family states they were informed of [facility] but patient is NOT a hospice INPATIENT  candidate.    They would like a second opinion.  Requested hospice to review case                     Acute on Chronic Respiratory Failure  Reported on 4-5L NC at home  Reaffirms that he does not want AVAPs device    HFpEF  Recommend resume cardiac meds as this can promote comfort  If becomes too much of a pill burden with dysphagia and aspiration, then would d/c.    COPD  Reported on 4-5L NC at home  Albuterol nebulizer reinstated  Would continue inhalers and home meds     BPH    cont Tamsulosin    Dysuria  Rec check UA/CS  If patient with urine infection, tx can help symptoms    Leg Pain   Oxycodone 5/10mg for mod/severe pain  Bowel regimen   Cont Senna    Hypothyroid  Cont Levothyroxine    Debility   Assist with care  Lives at home with daughter and son in law and reports needs assistance in his care    Encounter for Palliative Care  Patient Reaffirms he would NOT want any AVAPs devices - he understand without it, it can cause death.    Patient  on Comfort Measures  Recommend reinstating cardiac, COPD meds as this can promote comfort.   Would continue outpatient meds as long as it helps promotes comfort and helps with symptom management.    If it becomes a pill burden, then would d/c    Patient is hospice appropriate given his debility and  advanced COPD  Patient does NOT want to go to a Long Term Facility  Home hospice can be considered  [facility] can also be considered with  home hospice thereafter    Spoke to family - see [facility] note  Family states they were informed of [facility] but patient is NOT a hospice INPATIENT  candidate.    They would like a second opinion.  Requested hospice to review case               81yr man bedbound ,hx COPD 4-5L home O2, morbid obesity,  HTN, RA, admitted with acute on chronic respiratory failure in the setting of  HFpEF, COPD, pleural effusion    Acute on Chronic Respiratory Failure  Reported on 4-5L NC at home  Reaffirms that he does not want AVAPs device    HFpEF  Recommend resume cardiac meds as this can promote comfort  If becomes too much of a pill burden with dysphagia and aspiration, then would d/c.    COPD  Reported on 4-5L NC at home  Albuterol nebulizer reinstated  Would continue inhalers and home meds     BPH    cont Tamsulosin    Dysuria  Rec check UA/CS  If patient with urine infection, tx can help symptoms    Leg Pain   Oxycodone 5/10mg for mod/severe pain  Bowel regimen   Cont Senna    Hypothyroid  Cont Levothyroxine    Debility   Assist with care  Lives at home with daughter and son in law and reports needs assistance in his care    Encounter for Palliative Care  Patient Reaffirms he would NOT want any AVAPs devices - he understand without it, it can cause death.    Patient  on Comfort Measures  Recommend reinstating cardiac, COPD meds as this can promote comfort.   Would continue outpatient meds as long as it helps promotes comfort and helps with symptom management.    If it becomes a pill burden, then would d/c    Patient is hospice appropriate given his debility and  advanced COPD  Patient does NOT want to go to a Long Term Facility  Home hospice can be considered  [facility] can also be considered with  home hospice thereafter    Spoke to family - see [facility] note  Family states they were informed of [facility] but patient is NOT a hospice INPATIENT  candidate.    They would like a second opinion.  Requested hospice to review case               81yr man bedbound ,hx COPD 4-5L home O2, morbid obesity,  HTN, RA, admitted with acute on chronic respiratory failure in the setting of  HFpEF, COPD, pleural effusion    Acute on Chronic Respiratory Failure  Reported on 4-5L NC at home  Reaffirms that he does not want AVAPs device    HFpEF  Recommend resume cardiac meds as this can promote comfort  If becomes too much of a pill burden with dysphagia and aspiration, then would d/c.    COPD  Reported on 4-5L NC at home  Albuterol nebulizer reinstated  Would continue inhalers and home meds     BPH    cont Tamsulosin    Dysuria  Rec check UA/CS  If patient with urine infection, tx can help symptoms    Leg Pain   Oxycodone 5/10mg for mod/severe pain  Bowel regimen   Cont Senna    Hypothyroid  Cont Levothyroxine if patient wishes    Debility   Assist with care  Lives at home with daughter and son in law and reports needs assistance in his care    Encounter for Palliative Care  Patient Reaffirms he would NOT want any AVAPs devices - he understand without it, it can cause death.    Patient  on Comfort Measures  Recommend reinstating cardiac, COPD meds as this can promote comfort.   Would continue outpatient meds as long as it helps promotes comfort and helps with symptom management.    If it becomes a pill burden, then would d/c    Patient is hospice appropriate given his debility and  advanced COPD  Patient does NOT want to go to a Long Term Facility  Home hospice can be considered  [facility] can also be considered with  home hospice thereafter    Spoke to family - see [facility] note  Family states they were informed of [facility] but patient is NOT a hospice INPATIENT  candidate.    They would like a second opinion.  Requested hospice to review case

## 2022-03-14 NOTE — PROGRESS NOTE ADULT - SUBJECTIVE AND OBJECTIVE BOX
follow up for fluid overload , Scrotal swelling     chart reviewed    Pt is seen in am , he was resting in the bed     hospitalist note 3/13 noted .. GO , pt prefers to be on comfort  measures and hospice referral    met around 3 pm with the daughter and his son in law to discuss with     Vital Signs Last 24 Hrs  T(C): 36.3 (13 Mar 2022 08:00), Max: 36.9 (12 Mar 2022 16:00)  T(F): 97.3 (13 Mar 2022 08:00), Max: 98.4 (12 Mar 2022 16:00)  HR: 64 (13 Mar 2022 08:00) (54 - 67)  BP: 126/44 (13 Mar 2022 08:00) (124/54 - 149/54)  BP(mean): 65 (13 Mar 2022 08:00) (65 - 78)  RR: 18 (13 Mar 2022 08:00) (12 - 18)  SpO2: 93% (13 Mar 2022 08:00) (93% - 99%)    PHYSICAL EXAM:  General: in no acute distress; morbidly obese  Neck: Supple; non tender  Respiratory: No wheezes, rales or rhonchi  Cardiovascular: Regular rate and rhythm. S1 and S2   Gastrointestinal: Soft non-tender non-distended; Normal bowel sounds  Extremities: bilateral  venous stasis changes , significant pitting edema to the level of the knee    : Shukla in place                             10.4   5.85  )-----------( 137      ( 13 Mar 2022 09:19 )             35.8   03-13    145  |  94<L>  |  28.1<H>  ----------------------------<  125<H>  4.2   |  44.0<H>  |  0.95    Ca    8.4<L>      13 Mar 2022 09:19

## 2022-03-14 NOTE — CONSULT NOTE ADULT - TIME BILLING
Review of chart documents, labs, imaging. Direct patient assessment,  formulation of care plan. Discussion with  Interdisciplinary  team  Dr. Mayorga  including ACP  _____minutes with   _____ completion. Review of chart documents, labs, imaging. Direct patient assessment,  formulation of care plan. Discussion with  Interdisciplinary  team  Dr. Mayorga, Dr. Rubi, Blaire Jasmine RN, nurse, CM, nurse management  including ACP  _25___minutes

## 2022-03-14 NOTE — CONSULT NOTE ADULT - SUBJECTIVE AND OBJECTIVE BOX
HPI:  81 year old male with history of Lymphedema, Morbid Obesity, COPD on 4LNC, Smoking (stopped 3 years ago), Lung Nodules, HTN, Hypothyroidism, RA and COVID-19 brought by daughter with hypoxia and scrotal swelling. As per patient, he has noticed scrotal swelling for 10 days and it is getting worse along with lymphedema. He is also complaining of exertional dyspnea. Denies orthopnea or paroxysmal nocturnal dyspnea. Denies fever, sick contacts or recent travel.   Patient denies history of CHF. Has not seen Cardiologist in 3 years. He takes Lasix for lymphedema however he is not compliant with it.   In ER, he was found to be hypoxic to 88%, supplemental oxygen was increased to 5LNC. He was noted to have anasarca and was given a dose of Lasix 40 mg x 1.  (06 Mar 2022 16:53)      PERTINENT PMH REVIEWED: Yes     PAST MEDICAL & SURGICAL HISTORY:  Chronic obstructive pulmonary disease (COPD)    DM (diabetes mellitus), type 2    HTN (hypertension)    HLD (hyperlipidemia)    Lung nodules    2019 novel coronavirus disease (COVID-19)    History of hernia repair        SOCIAL HISTORY:                      Substance history:  Former smoker, no current ETOH, no drug use                    Admitted from:  home                     Restorationist/spirituality: NRA                    Cultural concerns:                      Surrogate/HCP/Guardian: Phone#: Daughter  Brandie Martínez     FAMILY HISTORY:  Family history of heart disease (Father, Grandparent)        Allergies    sulfamethoxazole (Urticaria)    Intolerances        ADVANCE DIRECTIVES/TREATMENT PREFERENCES:  DNR/DNI - MOLST, comfort measures only     Baseline ADLs (prior to admission):   Dependent      http://npcrc.org/files/news/[email or web address]_performance_scale_[email or web address].pdf    Present Symptoms:   Dyspnea:  Mild with activity  Nausea/Vomiting:  No   Anxiety:   No   Depression: No   Fatigue: Yes   Loss of appetite:  No   Constipation:  Not reported       Pain:  No              Character-            Duration-            Factors-            Severity    Pain AD Score:  http://geriatrictoolkit.[employer].edu/cog/painad.pdf (press ctrl + left click to view)    Review of Systems: Reviewed                      Positive:  + SOB  All other ROS negative    MEDICATIONS  (STANDING):  tamsulosin 0.8 milliGRAM(s) Oral at bedtime    MEDICATIONS  (PRN):  oxyCODONE    IR 5 milliGRAM(s) Oral every 4 hours PRN Moderate Pain (4 - 6)  oxyCODONE    IR 10 milliGRAM(s) Oral every 4 hours PRN Severe Pain (7 - 10)      PHYSICAL EXAM:    Vital Signs Last 24 Hrs  T(C): --  T(F): --  HR: --  BP: --  BP(mean): --  RR: 20 (14 Mar 2022 05:40) (20 - 20)  SpO2: 98% (14 Mar 2022 05:40) (97% - 98%)    Karnofsky   40 %  General:  M obese NAD  HEENT: NCAT  mmm  Lungs: comfortable  CV: RR  GI:   soft NTND       : + cesar  MSK: normal   weak  chair/bedbound  Neuro: AOx4  Skin: warm dry    LABS:                        10.4   5.85  )-----------( 137      ( 13 Mar 2022 09:19 )             35.8     03-13    145  |  94<L>  |  28.1<H>  ----------------------------<  125<H>  4.2   |  44.0<H>  |  0.95    Ca    8.4<L>      13 Mar 2022 09:19          I&O's Summary    13 Mar 2022 07:01  -  14 Mar 2022 07:00  --------------------------------------------------------  IN: 0 mL / OUT: 1990 mL / NET: -1990 mL    RADIOLOGY & ADDITIONAL STUDIES:    < from: Xray Chest 1 View- PORTABLE-Urgent (Xray Chest 1 View- PORTABLE-Urgent .) (03.06.22 @ 12:41) >  PROCEDURE DATE:  03/06/2022          INTERPRETATION:  Portable chest radiograph    CLINICAL INFORMATION: Dyspnea, shortness of breath.    TECHNIQUE:  Portable  AP chest radiograph.    COMPARISON: None. .    FINDINGS:  CATHETERS AND TUBES: None    PULMONARY: Small bilateral effusions blunting costophrenic angles. Upper   zones clear..   No pneumothorax.    HEART/VASCULAR: The  heart is enlarged in transverse diameter. .    BONES: Visualized osseous structures are intact.    IMPRESSION:   Small bilateral effusions. Mild Cardiomegaly...    --- End of Report ---    < end of copied text >      < from: TTE Echo Complete w/o Contrast w/ Doppler (03.07.22 @ 11:33) >  Summary:   1. Left ventricular ejection fraction, by visual estimation, is 50 to   55%.   2. Normal global left ventricular systolic function.   3. Moderatelyincreased LV wall thickness.   4. Moderately enlarged right ventricle. Mildly reduced RV systolic   function.   5. Right ventricular volume overload.   6. Moderate biatrial enlargement.   7. Moderate tricuspid regurgitation.   8. Estimated pulmonary artery systolic pressure is 52.2 mmHg assuming a   right atrial pressure of 15 mmHg, which is consistent with moderate   pulmonary hypertension.   9. Thickening of the anterior and posterior mitral valve leaflets.  10. Mild mitral annular calcification.  11. Trace mitral valve regurgitation.  12. Dilatation of the ascending aorta, measuring approx 3.67 cm.  13. Sclerotic aortic valve with normal opening.  14. There is no evidence of pericardial effusion.  15. Endocardial visualization was enhancedwith intravenous echo contrast.  16. There are no prior studies on this patient for comparison purposes.    Gayla Ramirez MD Electronically signed on 3/7/2022 at 12:38:54 PM        < end of copied text >

## 2022-03-14 NOTE — PROGRESS NOTE ADULT - SUBJECTIVE AND OBJECTIVE BOX
[facility]                                   [address] [place] [address]                                                      PHONE: [phone]                                                         FAX: [phone]  [email or web address]  ---------------------------------------------------------------------------------------------------------------------------------    Overnight events/patient complaints:   Pt made comfort care    sulfamethoxazole (Urticaria)    MEDICATIONS  (STANDING):  tamsulosin 0.8 milliGRAM(s) Oral at bedtime    MEDICATIONS  (PRN):  oxyCODONE    IR 5 milliGRAM(s) Oral every 4 hours PRN Moderate Pain (4 - 6)  oxyCODONE    IR 10 milliGRAM(s) Oral every 4 hours PRN Severe Pain (7 - 10)      Vital Signs Last 24 Hrs  T(C): 36.6 (13 Mar 2022 13:07), Max: 36.6 (13 Mar 2022 13:07)  T(F): 97.9 (13 Mar 2022 13:07), Max: 97.9 (13 Mar 2022 13:07)  HR: 70 (13 Mar 2022 13:07) (70 - 70)  BP: 140/53 (13 Mar 2022 13:07) (140/53 - 140/53)  BP(mean): 76 (13 Mar 2022 13:07) (76 - 76)  RR: 20 (14 Mar 2022 05:40) (20 - 21)  SpO2: 98% (14 Mar 2022 05:40) (93% - 98%)  ICU Vital Signs Last 24 Hrs  HILDA VELOZ I&VAIBHAV's Detail    13 Mar 2022 07:01  -  14 Mar 2022 07:00  --------------------------------------------------------  IN:  Total IN: 0 mL    OUT:    Indwelling Catheter - Urethral (mL): 1990 mL  Total OUT: 1990 mL    Total NET: -1990 mL        Drug Dosing Weight  HILDA VELOZ      PHYSICAL EXAM:  General:  alert and cooperative.  HEENT: Head; normocephalic, atraumatic.  Eyes: Pupils reactive, cornea wnl.  Neck: Supple, no nodes adenopathy, no NVD or carotid bruit or thyromegaly.  CARDIOVASCULAR: Normal S1 and S2, No murmur, rub, gallop or lift.   LUNGS:decreased breath sounds bilaterally.  ABDOMEN: Soft, nontender without mass or organomegaly. bowel sounds normoactive.  EXTREMITIES: No clubbing, cyanosis or edema. Distal pulses wnl.   SKIN: warm and dry with normal turgor.  NEURO: Alert/oriented x 3/normal motor exam. No pathologic reflexes.    PSYCH: normal affect.        LABS:                        10.4   5.85  )-----------( 137      ( 13 Mar 2022 09:19 )             35.8     03-13    145  |  94<L>  |  28.1<H>  ----------------------------<  125<H>  4.2   |  44.0<H>  |  0.95    Ca    8.4<L>      13 Mar 2022 09:19      HILDA VELOZ            RADIOLOGY & ADDITIONAL STUDIES:    INTERPRETATION OF TELEMETRY (personally reviewed): no events     ASSESSMENT AND PLAN:  In summary, HILDA VELOZ is an 81y Male with past medical history significant for  morbidly obese former smoker man with COPD, COVID 19 c/b respiratory failure with prolonged hospitalization, discharge on home O2 and now with chronic respiratory failure requiring 4L O2, hypertension, suspected lymphedema, who has had increasingly sedentary lifestyle since his last hospitalization who presents with dyspnea, increased edema and scrotal edema, found to have acute on chronic hypoxic respiratory failure     Acute on chronic hypoxic respiratory failure/hypervolemia/NSVT  -Pt now comfort care  -minimize medications  -No further cardiac care needed, please recall

## 2022-03-15 ENCOUNTER — APPOINTMENT (OUTPATIENT)
Dept: PULMONOLOGY | Facility: CLINIC | Age: 82
End: 2022-03-15

## 2022-03-15 LAB
APPEARANCE UR: ABNORMAL
BACTERIA # UR AUTO: ABNORMAL
BILIRUB UR-MCNC: NEGATIVE — SIGNIFICANT CHANGE UP
COLOR SPEC: YELLOW — SIGNIFICANT CHANGE UP
DIFF PNL FLD: ABNORMAL
EPI CELLS # UR: SIGNIFICANT CHANGE UP
GLUCOSE UR QL: NEGATIVE — SIGNIFICANT CHANGE UP
KETONES UR-MCNC: NEGATIVE — SIGNIFICANT CHANGE UP
LEUKOCYTE ESTERASE UR-ACNC: ABNORMAL
NITRITE UR-MCNC: POSITIVE
PH UR: 6 — SIGNIFICANT CHANGE UP (ref 5–8)
PROT UR-MCNC: 100
RBC CASTS # UR COMP ASSIST: ABNORMAL /HPF (ref 0–4)
SP GR SPEC: 1.01 — SIGNIFICANT CHANGE UP (ref 1.01–1.02)
UROBILINOGEN FLD QL: NEGATIVE — SIGNIFICANT CHANGE UP
WBC UR QL: SIGNIFICANT CHANGE UP /HPF (ref 0–5)

## 2022-03-15 PROCEDURE — 99232 SBSQ HOSP IP/OBS MODERATE 35: CPT

## 2022-03-15 PROCEDURE — 99233 SBSQ HOSP IP/OBS HIGH 50: CPT

## 2022-03-15 RX ORDER — PHENAZOPYRIDINE HCL 100 MG
100 TABLET ORAL THREE TIMES A DAY
Refills: 0 | Status: COMPLETED | OUTPATIENT
Start: 2022-03-15 | End: 2022-03-18

## 2022-03-15 RX ORDER — NYSTATIN CREAM 100000 [USP'U]/G
1 CREAM TOPICAL
Refills: 0 | Status: DISCONTINUED | OUTPATIENT
Start: 2022-03-15 | End: 2022-03-19

## 2022-03-15 RX ADMIN — OXYCODONE HYDROCHLORIDE 10 MILLIGRAM(S): 5 TABLET ORAL at 11:39

## 2022-03-15 RX ADMIN — Medication 100 MILLIGRAM(S): at 21:05

## 2022-03-15 RX ADMIN — Medication 100 MILLIGRAM(S): at 13:45

## 2022-03-15 RX ADMIN — OXYCODONE HYDROCHLORIDE 10 MILLIGRAM(S): 5 TABLET ORAL at 17:05

## 2022-03-15 RX ADMIN — Medication 100 MILLIGRAM(S): at 11:39

## 2022-03-15 RX ADMIN — OXYCODONE HYDROCHLORIDE 10 MILLIGRAM(S): 5 TABLET ORAL at 12:30

## 2022-03-15 RX ADMIN — TAMSULOSIN HYDROCHLORIDE 0.8 MILLIGRAM(S): 0.4 CAPSULE ORAL at 21:06

## 2022-03-15 RX ADMIN — OXYCODONE HYDROCHLORIDE 10 MILLIGRAM(S): 5 TABLET ORAL at 05:36

## 2022-03-15 RX ADMIN — OXYCODONE HYDROCHLORIDE 10 MILLIGRAM(S): 5 TABLET ORAL at 16:13

## 2022-03-15 RX ADMIN — OXYCODONE HYDROCHLORIDE 10 MILLIGRAM(S): 5 TABLET ORAL at 06:16

## 2022-03-15 RX ADMIN — SENNA PLUS 2 TABLET(S): 8.6 TABLET ORAL at 21:05

## 2022-03-15 NOTE — PROGRESS NOTE ADULT - ASSESSMENT
81yr man bedbound ,hx COPD 4-5L home O2, morbid obesity,  HTN, RA, admitted with acute on chronic respiratory failure in the setting of  HFpEF, COPD, pleural effusion    Acute on Chronic Respiratory Failure  Reported on 4-5L NC at home  Reaffirms that he does not want AVAPs device    HFpEF  Patient affirms he does not want to take any further cardiac meds    COPD  Reported on 4-5L NC at home  Albuterol nebulizer reinstated  Can continue inhalers and home meds     BPH    cont Tamsulosin    Dysuria  Pyridium added  Discussed with nurse to check cesar placement    Leg Pain   Oxycodone 5/10mg for mod/severe pain  Bowel regimen   Cont Senna    Hypothyroid  Patient does not want to further take his thyroid meds    Debility   Assist with care  Lives at home with daughter and son in law and reports needs assistance in his care    Encounter for Palliative Care  Patient states he is aware of that going home will be difficult given his care needs   81yr man bedbound ,hx COPD 4-5L home O2, morbid obesity,  HTN, RA, admitted with acute on chronic respiratory failure in the setting of  HFpEF, COPD, pleural effusion    Acute on Chronic Respiratory Failure  Reported on 4-5L NC at home  Reaffirms that he does not want AVAPs device    HFpEF  Patient affirms he does not want to take any further cardiac meds    COPD  Reported on 4-5L NC at home  Albuterol nebulizer reinstated  Can continue inhalers and home meds     BPH    cont Tamsulosin    Dysuria  Pyridium added  Discussed with nurse to check cesar placement    Leg Pain   Oxycodone 5/10mg for mod/severe pain  Bowel regimen   Cont Senna    Hypothyroid  Patient does not want to further take his thyroid meds    Debility   Assist with care  Lives at home with daughter and son in law and reports needs assistance in his care    Encounter for Palliative Care  Patient on comfort care -wants to focus on his comfort and does not want to take further meds.   Patient is home hospice appropriate.   Family requested second opinion about Inpatient hospice.  Informed by hospice  nurse Blaire that patient currently does NOT qualify for the [facility].   Patient discussed SNF given plan of  going home will be difficult given his care needs.  F/u SW

## 2022-03-15 NOTE — PROGRESS NOTE ADULT - SUBJECTIVE AND OBJECTIVE BOX
CC:  Follow up GOC , Symptoms    OVERNIGHT EVENTS:  none reported    Present Symptoms:   Dyspnea:  No     Nausea/Vomiting:  No   Anxiety:  No    Depression:  No    Fatigue:  Yes     Loss of appetite:   Yes     Constipation: No    Pain: No - legs,  cesar area            Character-            Duration-            Location-            Severity-    Pain AD Score:  [email or web address] (press ctrl + left click to view)    Review of Systems: Reviewed as above  All others negative    MEDICATIONS  (STANDING):  phenazopyridine 100 milliGRAM(s) Oral three times a day  senna 2 Tablet(s) Oral at bedtime  tamsulosin 0.8 milliGRAM(s) Oral at bedtime    MEDICATIONS  (PRN):  albuterol/ipratropium for Nebulization 3 milliLiter(s) Nebulizer every 6 hours PRN Shortness of Breath and/or Wheezing  oxyCODONE    IR 5 milliGRAM(s) Oral every 4 hours PRN Moderate Pain (4 - 6)  oxyCODONE    IR 10 milliGRAM(s) Oral every 4 hours PRN Severe Pain (7 - 10)      PHYSICAL EXAM:    Vital Signs Last 24 Hrs  T(C): --  T(F): --  HR: --  BP: --  BP(mean): --  RR: --  SpO2: --    Karnofsky: 40  %  General:   Obese awake alert NAD     HEENT:  NCAT mmm  Lungs: comfortable  non labored  CV:  RR  GI:  soft NTND           :    + cesar          MSK: weak  Skin:  warm/dry  Neuro AOx4     LABS:    Urinalysis Basic - ( 15 Mar 2022 10:28 )    Color: Yellow / Appearance: Slightly Turbid / S.015 / pH: x  Gluc: x / Ketone: Negative  / Bili: Negative / Urobili: Negative   Blood: x / Protein: 100 / Nitrite: Positive   Leuk Esterase: Moderate / RBC: 11-25 /HPF / WBC 0-2 /HPF   Sq Epi: x / Non Sq Epi: Occasional / Bacteria: Moderate      I&O's Summary    14 Mar 2022 07:01  -  15 Mar 2022 07:00  --------------------------------------------------------  IN: 0 mL / OUT: 2090 mL / NET: -0 mL        RADIOLOGY & ADDITIONAL STUDIES:      ADVANCE DIRECTIVES/TREATMENT PREFERENCES:  DNR/I - comfort measures CC:  Follow up GOC , Symptoms    OVERNIGHT EVENTS:  none reported    Present Symptoms:   Dyspnea:  No     Nausea/Vomiting:  No   Anxiety:  No    Depression:  No    Fatigue:  Yes     Loss of appetite:  No - ate all his fruit, Prefers cold foods  Constipation: No    Pain: No - legs,  cesar area            Character-            Duration-            Location-            Severity-    Pain AD Score:  [email or web address] (press ctrl + left click to view)    Review of Systems: Reviewed as above  All others negative    MEDICATIONS  (STANDING):  phenazopyridine 100 milliGRAM(s) Oral three times a day  senna 2 Tablet(s) Oral at bedtime  tamsulosin 0.8 milliGRAM(s) Oral at bedtime    MEDICATIONS  (PRN):  albuterol/ipratropium for Nebulization 3 milliLiter(s) Nebulizer every 6 hours PRN Shortness of Breath and/or Wheezing  oxyCODONE    IR 5 milliGRAM(s) Oral every 4 hours PRN Moderate Pain (4 - 6)  oxyCODONE    IR 10 milliGRAM(s) Oral every 4 hours PRN Severe Pain (7 - 10)      PHYSICAL EXAM:    Vital Signs Last 24 Hrs  T(C): --  T(F): --  HR: --  BP: --  BP(mean): --  RR: --  SpO2: --    Karnofsky: 40  %  General:   Obese awake alert NAD     HEENT:  NCAT mmm  Lungs: comfortable  non labored  CV:  RR  GI:  soft NTND           :    + cesar          MSK: weak  Skin:  warm/dry  Neuro AOx4     LABS:    Urinalysis Basic - ( 15 Mar 2022 10:28 )    Color: Yellow / Appearance: Slightly Turbid / S.015 / pH: x  Gluc: x / Ketone: Negative  / Bili: Negative / Urobili: Negative   Blood: x / Protein: 100 / Nitrite: Positive   Leuk Esterase: Moderate / RBC: 11-25 /HPF / WBC 0-2 /HPF   Sq Epi: x / Non Sq Epi: Occasional / Bacteria: Moderate      I&O's Summary    14 Mar 2022 07:01  -  15 Mar 2022 07:00  --------------------------------------------------------  IN: 0 mL / OUT: 0 mL / NET: - mL        RADIOLOGY & ADDITIONAL STUDIES:      ADVANCE DIRECTIVES/TREATMENT PREFERENCES:  DNR/I - comfort measures

## 2022-03-15 NOTE — PROGRESS NOTE ADULT - SUBJECTIVE AND OBJECTIVE BOX
follow up for fluid overload , CHF , pulmonary hypertension     chart reviewed    Pt is resting sleeping , no distress       Vital Signs Last 24 Hrs  T(C): --  T(F): --  HR: --  BP: --  BP(mean): --  RR: --  SpO2: --    PHYSICAL EXAM:  General: sleeping  no acute distress; morbidly obese  Neck: Supple,  non tender  Respiratory: No wheezes, rales or rhonchi  Cardiovascular: Regular rate and rhythm. S1 and S2   Gastrointestinal: Soft non-tender non-distended; Normal bowel sounds  Extremities: bilateral  venous stasis changes , significant pitting edema to the level of the knee    : Shukla in place                             10.4   5.85  )-----------( 137      ( 13 Mar 2022 09:19 )             35.8   03-13    145  |  94<L>  |  28.1<H>  ----------------------------<  125<H>  4.2   |  44.0<H>  |  0.95    Ca    8.4<L>      13 Mar 2022 09:19

## 2022-03-15 NOTE — PROGRESS NOTE ADULT - ASSESSMENT
81 year old male with history of Lymphedema, Morbid Obesity, COPD on 4LNC, Smoking (stopped 3 years ago), Lung Nodules, HTN, Hypothyroidism, RA and COVID-19 brought by daughter with hypoxia and scrotal swelling. In ER, he was found to be hypoxic to 88%, supplemental oxygen was increased to 5LNC. He was noted to have anasarca and was given a dose of Lasix 40 mg x 1.     1-Acute on chronic HFpEF (EF 50-55%) with anasarca   overall prognosis is poor   pT made decision to stop all medications , hospice   see GOC conversation    confirmed by me with daughter and the pt     2-Moderate Pulmonary Hypertension,   fluid overload   overall prognosis poor   as above   on comfort measures   DNR/ DNI     3-Acute on  Chronic hypercapnic respiratory failure due to  COPD and CHF, Obesity  , pulmonary HTN   on 4L NC at baseline  pt refuses AVAPS   comfort care   neb as needed   oxycodone PRN     4--R pleural effusion   comfort care     5- Left R  Lesion (suspected RCC)   Hematuria, improved  - Dr. Marley Discussed with patient,   he denies any history in past   - Needs further work up by Urology     5-Renal Insufficiency possible CKD type II, stable  - Unknowns baseline renal function   - Avoid nephrotoxic medications    6-BPH  - Shukla's catheter was placed in ER  on flomax       7-HTN  - Continue Metoprolol    8-DM 2 with neuropathy  - HbA1c 6.9%  - Accu checks and ISS    9-Hypothyroidism  - Continue Synthroid     10-Rheumatoid Arthritis  on comfort all meds stopped     - Oxycodone PRN - patient taking regularly however    DVT Prophylaxis -- Heparin SQ.   Dc to NH with hospice

## 2022-03-15 NOTE — PROGRESS NOTE ADULT - TIME BILLING
Review of chart documents, labs, imaging. Direct patient assessment,  formulation of care plan. Discussion with  Interdisciplinary  team  nurse, hospice, SW/CM

## 2022-03-16 PROCEDURE — 99233 SBSQ HOSP IP/OBS HIGH 50: CPT

## 2022-03-16 PROCEDURE — 99232 SBSQ HOSP IP/OBS MODERATE 35: CPT

## 2022-03-16 RX ORDER — ACETAMINOPHEN 500 MG
650 TABLET ORAL EVERY 8 HOURS
Refills: 0 | Status: DISCONTINUED | OUTPATIENT
Start: 2022-03-16 | End: 2022-03-18

## 2022-03-16 RX ORDER — CIPROFLOXACIN LACTATE 400MG/40ML
250 VIAL (ML) INTRAVENOUS
Refills: 0 | Status: DISCONTINUED | OUTPATIENT
Start: 2022-03-16 | End: 2022-03-18

## 2022-03-16 RX ORDER — MORPHINE SULFATE 50 MG/1
2 CAPSULE, EXTENDED RELEASE ORAL
Refills: 0 | Status: DISCONTINUED | OUTPATIENT
Start: 2022-03-16 | End: 2022-03-17

## 2022-03-16 RX ADMIN — Medication 100 MILLIGRAM(S): at 05:10

## 2022-03-16 RX ADMIN — Medication 100 MILLIGRAM(S): at 15:34

## 2022-03-16 RX ADMIN — OXYCODONE HYDROCHLORIDE 10 MILLIGRAM(S): 5 TABLET ORAL at 02:45

## 2022-03-16 RX ADMIN — OXYCODONE HYDROCHLORIDE 10 MILLIGRAM(S): 5 TABLET ORAL at 17:59

## 2022-03-16 RX ADMIN — OXYCODONE HYDROCHLORIDE 10 MILLIGRAM(S): 5 TABLET ORAL at 18:58

## 2022-03-16 RX ADMIN — Medication 250 MILLIGRAM(S): at 15:34

## 2022-03-16 RX ADMIN — NYSTATIN CREAM 1 APPLICATION(S): 100000 CREAM TOPICAL at 05:10

## 2022-03-16 RX ADMIN — OXYCODONE HYDROCHLORIDE 10 MILLIGRAM(S): 5 TABLET ORAL at 01:44

## 2022-03-16 RX ADMIN — OXYCODONE HYDROCHLORIDE 10 MILLIGRAM(S): 5 TABLET ORAL at 11:14

## 2022-03-16 RX ADMIN — OXYCODONE HYDROCHLORIDE 10 MILLIGRAM(S): 5 TABLET ORAL at 15:56

## 2022-03-16 NOTE — PROGRESS NOTE ADULT - ASSESSMENT
81 year old male with history of Lymphedema, Morbid Obesity, COPD on 4LNC, Smoking (stopped 3 years ago), Lung Nodules, HTN, Hypothyroidism, RA and COVID-19 brought by daughter with hypoxia and scrotal swelling. In ER, he was found to be hypoxic to 88%, supplemental oxygen was increased to 5LNC. He was noted to have anasarca and was given a dose of Lasix 40 mg x 1.     1-Acute on chronic HFpEF (EF 50-55%) with anasarca   overall prognosis is poor   pT made decision to stop all medications , hospice   see GOC conversation    confirmed by me with daughter and the pt     2-Moderate Pulmonary Hypertension,   fluid overload   overall prognosis poor   as above   on comfort measures   DNR/ DNI     3-Acute on  Chronic hypercapnic respiratory failure due to  COPD and CHF, Obesity  , pulmonary HTN   on 4L NC at baseline  pt refuses AVAPS   comfort care   neb as needed   oxycodone PRN     4--R pleural effusion   comfort care     5- Left R  Lesion (suspected RCC)   Hematuria, improved  - Dr. Marley Discussed with patient,   he denies any history in past   - Needs further work up by Urology     5-Renal Insufficiency possible CKD type II, stable  - Unknowns baseline renal function   - Avoid nephrotoxic medications    6-BPH  - Shukla's catheter was placed in ER  on flomax       7-HTN  - Continue Metoprolol    8-DM 2 with neuropathy  - HbA1c 6.9%  - Accu checks and ISS    9-Hypothyroidism  - Continue Synthroid     10-Rheumatoid Arthritis  on comfort all meds stopped     - Oxycodone PRN - patient taking regularly however    DVT Prophylaxis -- Heparin SQ.   Dc to NH with hospice      81 year old male with history of Lymphedema, Morbid Obesity, COPD on 4LNC, Smoking (stopped 3 years ago), Lung Nodules, HTN, Hypothyroidism, RA and COVID-19 brought by daughter with hypoxia and scrotal swelling. In ER, he was found to be hypoxic to 88%, supplemental oxygen was increased to 5LNC. He was noted to have anasarca and was given a dose of Lasix 40 mg x 1.     1-Acute on chronic HFpEF (EF 50-55%) with anasarca   overall prognosis is poor   pT made decision to stop all medications , comfort measures   pain control   cont morphine     2-Moderate Pulmonary Hypertension,   overall prognosis poor   on comfort measures   DNR/ DNI     3-Acute on  Chronic hypercapnic respiratory failure due to  COPD and CHF, Obesity  , pulmonary HTN   on oxygen   pt refused AVAPS   comfort care now     4--R pleural effusion   comfort care     5- Left R  Lesion (suspected RCC)   Hematuria, improved    6-Renal Insufficiency possible CKD     7-BPH  - Shukla's catheter was placed in ER  on flomax    family does not want any agressive testing treatment   started empirical cipro x3 days + nitrite       8-DM 2 with neuropathy  no accucheck on comfort care     9-Hypothyroidism      10-Rheumatoid Arthritis  on comfort all meds stopped   pain medication as needed     - Oxycodone PRN   adjust medication dose as needed for pain control       discharge planing to [facility] / NH with comfort measures

## 2022-03-16 NOTE — PROGRESS NOTE ADULT - SUBJECTIVE AND OBJECTIVE BOX
follow up for fluid overload , CHF , pulmonary hypertension     chart reviewed   pt is seen twice today in am and around the noon met the son in law with SW Director TRISTAN Tavares   all questions answered re discharge plan of care   Pt is resting in the bed ,m denied pain at present         Vital Signs Last 24 Hrs  T(C): --  T(F): --  HR: --  BP: --  BP(mean): --  RR: --  SpO2: --    PHYSICAL EXAM:  General: sleeping  no acute distress; morbidly obese  Neck: Supple,  non tender  Respiratory: No wheezes, rales or rhonchi  Cardiovascular: Regular rate and rhythm. S1 and S2   Gastrointestinal: Soft non-tender non-distended; Normal bowel sounds  Extremities: bilateral  venous stasis changes , significant pitting edema to the level of the knee    : Shukla in place                             10.4   5.85  )-----------( 137      ( 13 Mar 2022 09:19 )             35.8   03-13    145  |  94<L>  |  28.1<H>  ----------------------------<  125<H>  4.2   |  44.0<H>  |  0.95    Ca    8.4<L>      13 Mar 2022 09:19

## 2022-03-16 NOTE — PROGRESS NOTE ADULT - SUBJECTIVE AND OBJECTIVE BOX
CC:  Follow up GOC , Symptoms    OVERNIGHT EVENTS:  son at bedside - reports father just fell asleep, ate well, likes his fruit    Present Symptoms:   Dyspnea:  No    Nausea/Vomiting:  No  Anxiety:  No     Depression:  No    Fatigue:  Yes     Loss of appetite:  No   Constipation: Not Reported    Pain: yes - medicated  with Oxycodone            Character-            Duration-            Location-            Severity-    Pain AD Score:  [email or web address] (press ctrl + left click to view)    Review of Systems: Reviewed as above  Further ROS unable to  obtain due to poor mentation   All others negative    MEDICATIONS  (STANDING):  ciprofloxacin     Tablet 250 milliGRAM(s) Oral two times a day  nystatin Powder 1 Application(s) Topical two times a day  phenazopyridine 100 milliGRAM(s) Oral three times a day  senna 2 Tablet(s) Oral at bedtime  tamsulosin 0.8 milliGRAM(s) Oral at bedtime    MEDICATIONS  (PRN):  albuterol/ipratropium for Nebulization 3 milliLiter(s) Nebulizer every 6 hours PRN Shortness of Breath and/or Wheezing  oxyCODONE    IR 5 milliGRAM(s) Oral every 4 hours PRN Moderate Pain (4 - 6)  oxyCODONE    IR 10 milliGRAM(s) Oral every 4 hours PRN Severe Pain (7 - 10)      PHYSICAL EXAM:    Vital Signs Last 24 Hrs  T(C): --  T(F): --  HR: --  BP: --  BP(mean): --  RR: --  SpO2: --    Karnofsky:  %  General:         HEENT:  NCAT              (  )  ET tube   (  )  NGT  Lungs: comfortable  non labored  CV:    GI:             (  ) PEG  :               MSK:   Skin:  warm/dry  Neuro      LABS:              Urinalysis Basic - ( 15 Mar 2022 10:28 )    Color: Yellow / Appearance: Slightly Turbid / S.015 / pH: x  Gluc: x / Ketone: Negative  / Bili: Negative / Urobili: Negative   Blood: x / Protein: 100 / Nitrite: Positive   Leuk Esterase: Moderate / RBC: 11-25 /HPF / WBC 0-2 /HPF   Sq Epi: x / Non Sq Epi: Occasional / Bacteria: Moderate      I&O's Summary    15 Mar 2022 07:01  -  16 Mar 2022 07:00  --------------------------------------------------------  IN: 0 mL / OUT: 650 mL / NET: -650 mL        RADIOLOGY & ADDITIONAL STUDIES:      ADVANCE DIRECTIVES/TREATMENT PREFERENCES:   CC:  Follow up GOC , Symptoms    OVERNIGHT EVENTS:  son Isaac  at bedside - reports father just fell asleep, just took pain med.  Overall, doing okay, ate well, likes his fruit.      Present Symptoms:   Dyspnea:  No    Nausea/Vomiting:  No  Anxiety:  No     Depression:  No    Fatigue:  Yes     Loss of appetite:  No   Constipation: Not Reported    Pain: yes - medicated  with Oxycodone            Character-            Duration-            Location-            Severity-    Pain AD Score:  [email or web address] (press ctrl + left click to view)    Review of Systems: Reviewed as above  Further ROS unable to  obtain due to poor mentation   All others negative    MEDICATIONS  (STANDING):  ciprofloxacin     Tablet 250 milliGRAM(s) Oral two times a day  nystatin Powder 1 Application(s) Topical two times a day  phenazopyridine 100 milliGRAM(s) Oral three times a day  senna 2 Tablet(s) Oral at bedtime  tamsulosin 0.8 milliGRAM(s) Oral at bedtime    MEDICATIONS  (PRN):  albuterol/ipratropium for Nebulization 3 milliLiter(s) Nebulizer every 6 hours PRN Shortness of Breath and/or Wheezing  oxyCODONE    IR 5 milliGRAM(s) Oral every 4 hours PRN Moderate Pain (4 - 6)  oxyCODONE    IR 10 milliGRAM(s) Oral every 4 hours PRN Severe Pain (7 - 10)      PHYSICAL EXAM:    Vital Signs Last 24 Hrs  T(C): --  T(F): --  HR: --  BP: --  BP(mean): --  RR: --  SpO2: --    Karnofsky: 40 %  General:  M obese sleepy   NAD  HEENT:  NCAT          Lungs: comfortable  non labored  CV:  RR  GI: soft NG  :     + cesar          MSK: chair/bedbound  Skin:  warm/dry  Neuro  no focal deficits    LABS:      Urinalysis Basic - ( 15 Mar 2022 10:28 )    Color: Yellow / Appearance: Slightly Turbid / S.015 / pH: x  Gluc: x / Ketone: Negative  / Bili: Negative / Urobili: Negative   Blood: x / Protein: 100 / Nitrite: Positive   Leuk Esterase: Moderate / RBC: 11-25 /HPF / WBC 0-2 /HPF   Sq Epi: x / Non Sq Epi: Occasional / Bacteria: Moderate      I&O's Summary    15 Mar 2022 07:01  -  16 Mar 2022 07:00  --------------------------------------------------------  IN: 0 mL / OUT: 650 mL / NET: -650 mL        RADIOLOGY & ADDITIONAL STUDIES:      ADVANCE DIRECTIVES/TREATMENT PREFERENCES:  DNR/I - Comfort measures

## 2022-03-16 NOTE — PROGRESS NOTE ADULT - TIME BILLING
Review of chart documents, labs, imaging. Direct patient assessment,  formulation of care plan. Discussion with  Interdisciplinary  team  ANOOP Alexander,  Lorena SINGH,  Blaire Jasmine RN hospice

## 2022-03-16 NOTE — PROGRESS NOTE ADULT - ASSESSMENT
81yr man bedbound ,hx COPD 4-5L home O2, morbid obesity,  HTN, RA, admitted with acute on chronic respiratory failure in the setting of  HFpEF, COPD, pleural effusion    Acute on Chronic Respiratory Failure  Reported on 4-5L NC at home  Does not want AVAPs device    HFpEF  Patient affirms he does not want to take any further cardiac meds    COPD  Reported on 4-5L NC at home  Albuterol nebulizer PRN  Can continue inhalers and home meds     BPH    cont Tamsulosin    Dysuria  Pyridium added  Discussed with nurse to check cesar placement  Cipro started    Leg Pain   Oxycodone 5/10mg for mod/severe pain  If taking multiple PRNs, can consider long acting med, Oxycontin  cont monitoring PRN use  Bowel regimen   Cont Senna      Hypothyroid  Patient does not want to further take his thyroid meds    Debility   Assist with care  Lives at home with daughter and son in law and reports needs assistance in his care    Encounter for Palliative Care  Continue monitoring for symptoms.   Patient is home hospice appropriate.  However, patient's care exceeding more than what hospice can provide.   Multiple discussions with SW and hospice regarding discharge plan of care.   Family discussing with SW regarding discharge plan likely SNF on comfort.

## 2022-03-17 PROCEDURE — 99233 SBSQ HOSP IP/OBS HIGH 50: CPT

## 2022-03-17 PROCEDURE — 99497 ADVNCD CARE PLAN 30 MIN: CPT | Mod: 25

## 2022-03-17 PROCEDURE — 99498 ADVNCD CARE PLAN ADDL 30 MIN: CPT | Mod: 25

## 2022-03-17 PROCEDURE — 99497 ADVNCD CARE PLAN 30 MIN: CPT

## 2022-03-17 RX ORDER — OXYCODONE HYDROCHLORIDE 5 MG/1
10 TABLET ORAL EVERY 4 HOURS
Refills: 0 | Status: DISCONTINUED | OUTPATIENT
Start: 2022-03-17 | End: 2022-03-18

## 2022-03-17 RX ORDER — GABAPENTIN 400 MG/1
100 CAPSULE ORAL AT BEDTIME
Refills: 0 | Status: DISCONTINUED | OUTPATIENT
Start: 2022-03-17 | End: 2022-03-18

## 2022-03-17 RX ORDER — FENTANYL CITRATE 50 UG/ML
1 INJECTION INTRAVENOUS
Refills: 0 | Status: DISCONTINUED | OUTPATIENT
Start: 2022-03-17 | End: 2022-03-18

## 2022-03-17 RX ADMIN — NYSTATIN CREAM 1 APPLICATION(S): 100000 CREAM TOPICAL at 17:22

## 2022-03-17 RX ADMIN — GABAPENTIN 100 MILLIGRAM(S): 400 CAPSULE ORAL at 22:14

## 2022-03-17 RX ADMIN — NYSTATIN CREAM 1 APPLICATION(S): 100000 CREAM TOPICAL at 05:11

## 2022-03-17 RX ADMIN — OXYCODONE HYDROCHLORIDE 5 MILLIGRAM(S): 5 TABLET ORAL at 22:14

## 2022-03-17 RX ADMIN — OXYCODONE HYDROCHLORIDE 5 MILLIGRAM(S): 5 TABLET ORAL at 14:52

## 2022-03-17 RX ADMIN — FENTANYL CITRATE 1 PATCH: 50 INJECTION INTRAVENOUS at 22:01

## 2022-03-17 RX ADMIN — Medication 650 MILLIGRAM(S): at 11:23

## 2022-03-17 RX ADMIN — Medication 100 MILLIGRAM(S): at 11:23

## 2022-03-17 RX ADMIN — Medication 650 MILLIGRAM(S): at 05:11

## 2022-03-17 RX ADMIN — FENTANYL CITRATE 1 PATCH: 50 INJECTION INTRAVENOUS at 12:52

## 2022-03-17 RX ADMIN — Medication 650 MILLIGRAM(S): at 22:44

## 2022-03-17 RX ADMIN — MORPHINE SULFATE 2 MILLIGRAM(S): 50 CAPSULE, EXTENDED RELEASE ORAL at 12:29

## 2022-03-17 RX ADMIN — MORPHINE SULFATE 2 MILLIGRAM(S): 50 CAPSULE, EXTENDED RELEASE ORAL at 11:19

## 2022-03-17 RX ADMIN — Medication 250 MILLIGRAM(S): at 02:10

## 2022-03-17 RX ADMIN — Medication 100 MILLIGRAM(S): at 05:11

## 2022-03-17 RX ADMIN — Medication 650 MILLIGRAM(S): at 22:15

## 2022-03-17 RX ADMIN — Medication 250 MILLIGRAM(S): at 14:53

## 2022-03-17 RX ADMIN — SENNA PLUS 2 TABLET(S): 8.6 TABLET ORAL at 22:15

## 2022-03-17 RX ADMIN — Medication 650 MILLIGRAM(S): at 06:21

## 2022-03-17 RX ADMIN — OXYCODONE HYDROCHLORIDE 5 MILLIGRAM(S): 5 TABLET ORAL at 15:52

## 2022-03-17 RX ADMIN — OXYCODONE HYDROCHLORIDE 5 MILLIGRAM(S): 5 TABLET ORAL at 23:14

## 2022-03-17 RX ADMIN — Medication 100 MILLIGRAM(S): at 22:15

## 2022-03-17 NOTE — GOALS OF CARE CONVERSATION - ADVANCED CARE PLANNING - TREATMENT GUIDELINE COMMENT
Added Fentanyl 25mcg patch, Gabapentin 100 qhs,  Oxycodone 10mg solution  Continue pain assessment  HENRIETTA with comfort

## 2022-03-17 NOTE — PROGRESS NOTE ADULT - SUBJECTIVE AND OBJECTIVE BOX
CC:  Follow up GOC , Symptoms    OVERNIGHT EVENTS:  Nurse Rosalio states she directly observed  patient and has  no problems swallowing his meds or food.        Present Symptoms:   Dyspnea:  No       Nausea/Vomiting:  No  Yes  Anxiety:  No   Yes    Depression:  No  Yes  Unable  Fatigue:  Yes   No   Unable  Loss of appetite:  No Yes   NA   Constipation: Not Reported   Yes    Pain: No   No Signs            Character-            Duration-            Location-            Severity-    Pain AD Score:  [email or web address] (press ctrl + left click to view)    Review of Systems: Reviewed as above  Further ROS unable to  obtain due to poor mentation   All others negative    MEDICATIONS  (STANDING):  acetaminophen     Tablet .. 650 milliGRAM(s) Oral every 8 hours  ciprofloxacin     Tablet 250 milliGRAM(s) Oral two times a day  nystatin Powder 1 Application(s) Topical two times a day  phenazopyridine 100 milliGRAM(s) Oral three times a day  senna 2 Tablet(s) Oral at bedtime  tamsulosin 0.8 milliGRAM(s) Oral at bedtime    MEDICATIONS  (PRN):  albuterol/ipratropium for Nebulization 3 milliLiter(s) Nebulizer every 6 hours PRN Shortness of Breath and/or Wheezing  morphine   Solution 2 milliGRAM(s) Oral every 3 hours PRN breaktrough pain as needed  oxyCODONE    IR 5 milliGRAM(s) Oral every 4 hours PRN Moderate Pain (4 - 6)      PHYSICAL EXAM:    Vital Signs Last 24 Hrs  T(C): --  T(F): --  HR: --  BP: --  BP(mean): --  RR: --  SpO2: --    Karnofsky:  %  General:         HEENT:  NCAT              (  )  ET tube   (  )  NGT  Lungs: comfortable  non labored  CV:    GI:             (  ) PEG  :               MSK:   Skin:  warm/dry  Neuro      LABS:              Urinalysis Basic - ( 15 Mar 2022 10:28 )    Color: Yellow / Appearance: Slightly Turbid / S.015 / pH: x  Gluc: x / Ketone: Negative  / Bili: Negative / Urobili: Negative   Blood: x / Protein: 100 / Nitrite: Positive   Leuk Esterase: Moderate / RBC: 11-25 /HPF / WBC 0-2 /HPF   Sq Epi: x / Non Sq Epi: Occasional / Bacteria: Moderate      I&O's Summary    16 Mar 2022 07:01  -  17 Mar 2022 07:00  --------------------------------------------------------  IN: 0 mL / OUT: 425 mL / NET: -425 mL        RADIOLOGY & ADDITIONAL STUDIES:      ADVANCE DIRECTIVES/TREATMENT PREFERENCES:   CC:  Follow up GOC , Symptoms    OVERNIGHT EVENTS:  Nurse Rosalio states she directly observed  patient and has  no problems swallowing his meds or food.      Present Symptoms:   Dyspnea:  No       Nausea/Vomiting:  No   Anxiety:  No     Depression:  No    Fatigue:  Yes     Loss of appetite:  No   Constipation: Not Reported   Yes    Pain: burning at catheter site - better          discomfort to feet because it hits the baseboard            Character-            Duration-            Location-            Severity-    Pain AD Score:  [email or web address] (press ctrl + left click to view)    Review of Systems: Reviewed as above  Denies leg pains   All others negative    MEDICATIONS  (STANDING):  acetaminophen     Tablet .. 650 milliGRAM(s) Oral every 8 hours  ciprofloxacin     Tablet 250 milliGRAM(s) Oral two times a day  nystatin Powder 1 Application(s) Topical two times a day  phenazopyridine 100 milliGRAM(s) Oral three times a day  senna 2 Tablet(s) Oral at bedtime  tamsulosin 0.8 milliGRAM(s) Oral at bedtime    MEDICATIONS  (PRN):  albuterol/ipratropium for Nebulization 3 milliLiter(s) Nebulizer every 6 hours PRN Shortness of Breath and/or Wheezing  morphine   Solution 2 milliGRAM(s) Oral every 3 hours PRN breaktrough pain as needed  oxyCODONE    IR 5 milliGRAM(s) Oral every 4 hours PRN Moderate Pain (4 - 6)      PHYSICAL EXAM:    Vital Signs Last 24 Hrs  T(C): --  T(F): --  HR: --  BP: --  BP(mean): --  RR: --  SpO2: --    Karnofsky:  40 %  General:   M obese awake alert NAD-  in bed eating fruit      HEENT:  NCAT   mmm  Lungs: comfortable  non labored  CV:  RR  GI:  soft NTND      :    cesar           MSK:  chair/bedbound  Skin:  warm/dry  Neuro  no focal defiicts    LABS:    Urinalysis Basic - ( 15 Mar 2022 10:28 )    Color: Yellow / Appearance: Slightly Turbid / S.015 / pH: x  Gluc: x / Ketone: Negative  / Bili: Negative / Urobili: Negative   Blood: x / Protein: 100 / Nitrite: Positive   Leuk Esterase: Moderate / RBC: 11-25 /HPF / WBC 0-2 /HPF   Sq Epi: x / Non Sq Epi: Occasional / Bacteria: Moderate      I&O's Summary    16 Mar 2022 07:01  -  17 Mar 2022 07:00  --------------------------------------------------------  IN: 0 mL / OUT: 425 mL / NET: -425 mL        RADIOLOGY & ADDITIONAL STUDIES:      ADVANCE DIRECTIVES/TREATMENT PREFERENCES:

## 2022-03-17 NOTE — PROGRESS NOTE ADULT - SUBJECTIVE AND OBJECTIVE BOX
follow up for fluid overload , CHF , pulmonary hypertension     Pt is seen in am , was having breakfast feeding himself   denied any pain   around 11 am met with daughter son , pt and son in law , SW dep director Alea Alberto and palliative care attending Dr Forrester     see [facility] doc below       Vital Signs Last 24 Hrs  T(C): --  T(F): --  HR: --  BP: --  BP(mean): --  RR: --  SpO2: --    PHYSICAL EXAM:  General: awake alert , no pain comfortable    Respiratory: No wheezes, rales or rhonchi, poor inspiratory effort   Cardiovascular: Regular rate and rhythm. S1 and S2   Gastrointestinal: Soft non-tender non-distended; Normal bowel sounds  Extremities: bilateral  venous stasis changes , significant pitting edema to the level of the knee bilaterally    : Shukla in place     no labs on comfort care

## 2022-03-17 NOTE — GOALS OF CARE CONVERSATION - ADVANCED CARE PLANNING - FAMILY/CHILD(REN)
Daughter (Brandie), Son in law (Mateo)
Daughter Radha [phone], Son in law Mateo
Daughter and Son in law
daughter, son  Son in law

## 2022-03-17 NOTE — PROGRESS NOTE ADULT - ASSESSMENT
81 year old male with history of Lymphedema, Morbid Obesity, COPD on 4LNC, Smoking (stopped 3 years ago), Lung Nodules, HTN, Hypothyroidism, RA and COVID-19 brought by daughter with hypoxia and scrotal swelling. In ER, he was found to be hypoxic to 88%, supplemental oxygen was increased to 5LNC. He was noted to have anasarca and was given a dose of Lasix 40 mg x 1.     assesment / Plan     1-Acute on chronic HFpEF (EF 50-55%) with anasarca     2-Moderate Pulmonary Hypertension,     3-Acute on  Chronic hypercapnic respiratory failure due to  COPD and CHF, Obesity  , pulmonary HTN     4--R pleural effusion       5- Left R  Lesion (suspected RCC)     6-Renal Insufficiency possible CKD type II, stable  -  7-BPH  has cesar   pain with urination   added pyridium   on flomax     8-HTN  9-DM 2 with neuropathy    10-Hypothyroidism  - Continue Synthroid     11-Rheumatoid Arthritis     overall prognosis poor   pt decided to stop all medications except pain meds   comfort measures     uncontrolled cronic  pain  add neurontin , fentanly patch , cont oxycodone prn   cont titrate pain meds as needed     discharge to [facility] facility with comfort measures once pain controlled in next few days     -

## 2022-03-17 NOTE — PROGRESS NOTE ADULT - TREATMENT GUIDELINE COMMENT
comfort measures only per pt clear wishes as documented previously and as he verbalized at present awake alert m oriented

## 2022-03-17 NOTE — PROGRESS NOTE ADULT - TIME BILLING
Review of chart documents, labs, imaging. Direct patient assessment,  formulation of care plan. Discussion with  Interdisciplinary  team  Dr. Mayorga, Blaire Jasmine hospice nurse,  ANOOP SINGH,  nurse Rosalio, patient Review of chart documents, labs, imaging. Direct patient assessment,  formulation of care plan. Discussion with  Interdisciplinary  team  Dr. Mayorga, Blaire Jasmine hospice nurse,  ANOOP SINGH,  nurse Rosalio, patient  ACP -  50min Review of chart documents, labs, imaging. Direct patient assessment,  formulation of care plan. Discussion with  Interdisciplinary  team  Dr. Mayorga, Blaire Jasmine hospice nurse,  ANOOP SINGH,  nurse Rosaloi, patient, family  ACP -  50min

## 2022-03-17 NOTE — GOALS OF CARE CONVERSATION - ADVANCED CARE PLANNING - CONVERSATION/DISCUSSION
Diagnosis/Prognosis/Treatment Options
Diagnosis/Prognosis/Treatment Options
Diagnosis/Prognosis/MOLST Discussed/Hospice Referral
Diagnosis/Prognosis/MOLST Discussed/Hospice Referral

## 2022-03-17 NOTE — GOALS OF CARE CONVERSATION - ADVANCED CARE PLANNING - CONVERSATION DETAILS
Discussed with Daughter current clinical updated. We discussed AVAPs needed for treatment but patient expressed not wanting to wear it. We cannot force him to wear it and she understands this. She will speak to her father regarding the AVAPs. I explained that if he does not wear it and he becomes obtunded again or his oxygen levels drop it could lead to his death. she expressed understanding. She does not want him to suffer. She will speak to him and is agreeable to speaking with palliative care.
Family expressed  father's issues with pain and overall goal is to be pain free.   Informed them main issue that father  complained of was burning at urethral site, concern for infection and steps we have taken to address-  started abx, pyridium, checking cesar placement  Patient and family state  he has pain all over, difficulty swallowing pill  at times, and want more steady pain regimen.    Discussed adding Fentanyl patch for more seamless pain relief, risks/benefits, and Gabapentin likely from some neuropathy.  Explained Fentanyl will take a few days to reach a steady state, but can still have PRN doses of meds.      Patient agreeable to allow daughter to make decisions for him.   Discussed plan of care - HENRIETTA on comfort.    Tried to prepare family that given his condition, things can sharply change and may be appropriate for the [facility] at that time
Family states father has been declining over the last 2 years.  He is bedbound on 4-5L of O2  Discussed plan of care - reinstating some cardiac and COPD meds for comfort, for which they were understanding and in agreement.  Discussed plan of care:  Discussed home hospice - Both daughter and son in law work, and thus unlikely to be feasible given hospice limited HHA.  Informed them father does NOT want to go to a nursing home    They were informed [facility] could [facility].  Explained hospice inpatient criteria and currently NOT inpatient appropriate.   They would like a second opinion
discussed in detail [facility] patient this morning regarding follow up of a conversation started by Dr. Taveras. Discussed with patient that his immobility and non-compliance with AVAPS will have a snowball effect on his health and will lead to death ultimately. Patient understands this. I told him that based on his current diagnoses he qualifies for hospice which I explained as an end of life program. The goal, I explained would be to maximize comfort so that patient can live peacefully during however long he has left to live. Patient states that he would like to be on hospice.    I discussed with daughter Brandie and son in law Mateo the patient's sentiments. They indicate that they have noticed a 2 year long decline since falling ill with COVID. Patient wife also  3 years ago. He had been evaluated for depression in the past. Patient has chronically indicated that he has wanted to go be with his wife.    I informed the fmaily that hospice will reach out to them likely on Monday to initiate the process. He may ultimately need placement in facility that can accommodate CMO.    Patient today complaining of pain in the leg as well as periodically when he feels that he is urinating. I discussed with him regarding comfort measures status while he is here in the hospital which he is in agreement with. Discussed no further vitals signs, blood draws, AVAPS machine and telemetry and patient agrees. Daughter and son-in-law aware of CMO status.

## 2022-03-17 NOTE — PROGRESS NOTE ADULT - ASSESSMENT
81yr man bedbound ,hx COPD 4-5L home O2, morbid obesity,  HTN, RA, admitted with acute on chronic respiratory failure in the setting of  HFpEF, COPD, pleural effusion    Acute on Chronic Respiratory Failure  Reported on 4-5L NC at home  Does not want AVAPs device    HFpEF  Patient affirms he does not want to take any further cardiac meds    COPD  Reported on 4-5L NC at home  Albuterol nebulizer PRN  Can continue inhalers and home meds     BPH    cont Tamsulosin    Dysuria  Cont Cipro and Pyridium   Discussed with nurse to check cesar placement  monitor    Leg Pain   Patient reports NO Leg pain.  Feet uncomfortable because it hits the base board.  Recommend adjust patient in bed  Pain meds as ordered -   Oxycodone changed to   If taking multiple PRNs, can consider long acting med, Oxycontin  cont monitoring PRN use  Bowel regimen   Cont Senna      Hypothyroid  Patient does not want to further take his thyroid meds    Debility   Assist with care  Lives at home with daughter and son in law and reports needs assistance in his care    Encounter for Palliative Care   81yr man bedbound ,hx COPD 4-5L home O2, morbid obesity,  HTN, RA, admitted with acute on chronic respiratory failure in the setting of  HFpEF, COPD, pleural effusion    Acute on Chronic Respiratory Failure  Reported on 4-5L NC at home  Does not want AVAPs device    HFpEF  Patient affirms he does not want to take any further cardiac meds    COPD  Reported on 4-5L NC at home  Albuterol nebulizer PRN  Can continue inhalers and home meds     Dysuria  Pain mostly from dysuria  Cont Cipro and Pyridium   Discussed with nurse to check cesar placement  monitor    Pain  Patient reports pain mainly from dysuria   Patient reports NO Leg pain.  Feet uncomfortable because it hits the base board.  Recommend adjust patient in bed  Pain meds as ordered -Changed to Morphine 2mg IVP PRN overnight  Cont Oxycodone 5mg q4hr PRN  If taking multiple PRNs, can consider long acting med, Oxycontin  However, it seems pain mainly from dysuria and likely UTI - being tx as above.   Bowel regimen     BPH    cont Tamsulosin    Hypothyroid  Patient does not want to further take his thyroid meds    Debility   Assist with care  Lives at home with daughter and son in law and reports needs assistance in his care    Encounter for Palliative Care  Continuing to monitor for symptoms -   Patient reports main issue of discomfort is intermittent dysuria, which is currently being tx with Cipro.   Hopefully, treating the main source of the pain will alleviate the issues.   Discssed with nurse, no issues of dysphagia.  Cont to monitor  Discussed with hospice - currently not inpatient appropriate.   SW working out plan of care hereafter   81yr man bedbound ,hx COPD 4-5L home O2, morbid obesity,  HTN, RA, admitted with acute on chronic respiratory failure in the setting of  HFpEF, COPD, pleural effusion    Acute on Chronic Respiratory Failure  Reported on 4-5L NC at home  Does not want AVAPs device    HFpEF  Patient affirms he does not want to take any further cardiac meds    COPD  Reported on 4-5L NC at home  Albuterol nebulizer PRN  Can continue inhalers and home meds     Dysuria  Pain mostly from dysuria  Cont Cipro and Pyridium   Discussed with nurse to check cesar placement  monitor    Pain  Patient reports pain mainly from dysuria   Patient reports NO Leg pain.  Feet uncomfortable because it hits the base board.  Recommend adjust patient in bed  Pain meds as ordered -Changed to Morphine 2mg IVP PRN overnight  Cont Oxycodone 5mg q4hr PRN  If taking multiple PRNs, can consider long acting med, Oxycontin  However, it seems pain mainly from dysuria and likely UTI - being tx as above.   Bowel regimen     BPH    cont Tamsulosin    Hypothyroid  Patient does not want to further take his thyroid meds    Debility   Assist with care  Lives at home with daughter and son in law and reports needs assistance in his care    Encounter for Palliative Care  Continuing to monitor for symptoms -   Patient reports main issue of discomfort is intermittent dysuria, which is currently being tx with Cipro.   Hopefully, treating the main source of the pain will alleviate the issues.   Discssed with nurse, no issues of dysphagia.  Cont to monitor  Discussed with hospice - currently not inpatient appropriate.   FRANCISCO working out plan of care hereafter    ADDENDUM:  Family meeting - See [facility] note.  In summary   1.  Patient now states he has pain all over.  Goal is to address his pain so he can be comfortable.    2.  Discussed pain plan- Added Fentanyl 25mcg patch,  Gabapentin 100 qhs.   3.  Discussed care plan hereafter - HENRIETTA on comfort.  Family made aware that things can change at any time, and then may be appropriate for an inpatient setting    81yr man bedbound ,hx COPD 4-5L home O2, morbid obesity,  HTN, RA, admitted with acute on chronic respiratory failure in the setting of  HFpEF, COPD, pleural effusion    Acute on Chronic Respiratory Failure  Reported on 4-5L NC at home  Does not want AVAPs device    HFpEF  Patient affirms he does not want to take any further cardiac meds    COPD  Reported on 4-5L NC at home  Albuterol nebulizer PRN  Can continue inhalers and home meds     Dysuria  Pain mostly from dysuria  Cont Cipro and Pyridium   Discussed with nurse to check cesar placement  monitor    Pain  Patient reports pain mainly from dysuria   Patient reports NO Leg pain.  Feet uncomfortable because it hits the base board.  Recommend adjust patient in bed  Pain meds as ordered -Changed to Morphine 2mg IVP PRN overnight  Cont Oxycodone 5mg q4hr PRN  If taking multiple PRNs, can consider long acting med, Oxycontin  However, it seems pain mainly from dysuria and likely UTI - being tx as above.   Bowel regimen     BPH    cont Tamsulosin    Hypothyroid  Patient does not want to further take his thyroid meds    Debility   Assist with care  Lives at home with daughter and son in law and reports needs assistance in his care    Encounter for Palliative Care  Continuing to monitor for symptoms -   Patient reports main issue of discomfort is intermittent dysuria, which is currently being tx with Cipro.   Hopefully, treating the main source of the pain will alleviate the issues.   Discssed with nurse, no issues of dysphagia.  Cont to monitor  Discussed with hospice - currently not inpatient appropriate.   FRANCISCO working out plan of care hereafter    ADDENDUM:  Family meeting - See [facility] note.  In summary   1.  Patient now states he has pain all over.  Goal is to address his pain so he can be comfortable.    2.  Discussed pain plan- Added Fentanyl 25mcg patch,  Gabapentin 100 qhs. Oxycodone 10mg solution PRN  3.  Discussed care plan hereafter - HENRIETTA on comfort.  Family made aware that things can change at any time, and then may be appropriate for an inpatient setting    81yr man bedbound ,hx COPD 4-5L home O2, morbid obesity,  HTN, RA, admitted with acute on chronic respiratory failure in the setting of  HFpEF, COPD, pleural effusion    Acute on Chronic Respiratory Failure  Reported on 4-5L NC at home  Does not want AVAPs device    HFpEF  Patient affirms he does not want to take any further cardiac meds    COPD  Reported on 4-5L NC at home  Albuterol nebulizer PRN  Can continue inhalers and home meds     Dysuria  Pain mostly from dysuria  Cont Cipro and Pyridium   Discussed with nurse to check cesar placement  monitor    Pain  Patient reports pain mainly from dysuria   Patient reports NO Leg pain.  Feet uncomfortable because it hits the base board.  Recommend adjust patient in bed  Pain meds as ordered -Changed to Morphine 2mg IVP PRN overnight  Cont Oxycodone 5mg q4hr PRN  If taking multiple PRNs, can consider long acting med, Oxycontin  However, it seems pain mainly from dysuria and likely UTI - being tx as above.   Bowel regimen     BPH    cont Tamsulosin    Hypothyroid  Patient does not want to further take his thyroid meds    Debility   Assist with care  Lives at home with daughter and son in law and reports needs assistance in his care    Encounter for Palliative Care  Continuing to monitor for symptoms -   Patient reports main issue of discomfort is intermittent dysuria, which is currently being tx with Cipro.   Hopefully, treating the main source of the pain will alleviate the issues.   Discssed with nurse, no issues of dysphagia.  Cont to monitor  Discussed with hospice - currently not inpatient appropriate.   FRANCISCO working out plan of care hereafter    ADDENDUM:  Family meeting - See [facility] note.  In summary   1.  Goal is to address his pain so he can be comfortable.  Patient now states he has pain all over.  2.  Discussed pain plan- Added Fentanyl 25mcg patch,  Gabapentin 100 qhs. Oxycodone 10mg solution PRN  3.  Discussed care plan hereafter - HENRIETTA on comfort.  Family made aware that things can change at any time, and then may be appropriate for an inpatient setting   Emotional support   81yr man bedbound ,hx COPD 4-5L home O2, morbid obesity,  HTN, RA, admitted with acute on chronic respiratory failure in the setting of  HFpEF, COPD, pleural effusion    Acute on Chronic Respiratory Failure  Reported on 4-5L NC at home  Does not want AVAPs device    HFpEF  Patient affirms he does not want to take any further cardiac meds    COPD  Reported on 4-5L NC at home  Albuterol nebulizer PRN  Can continue inhalers and home meds     Dysuria  Pain mostly from dysuria  Cont Cipro and Pyridium   Discussed with nurse to check cesar placement  monitor    Pain  Patient reports pain mainly from dysuria   Patient reports NO Leg pain.  Feet uncomfortable because it hits the base board.  Recommend adjust patient in bed  Pain meds as ordered -Changed to Morphine 2mg IVP PRN overnight  Cont Oxycodone 5mg q4hr PRN  If taking multiple PRNs, can consider long acting med, Oxycontin  However, it seems pain mainly from dysuria and likely UTI - being tx as above.   Bowel regimen     BPH    cont Tamsulosin    Hypothyroid  Patient does not want to further take his thyroid meds    Debility   Assist with care  Lives at home with daughter and son in law and reports needs assistance in his care    Encounter for Palliative Care  Continuing to monitor for symptoms -   Patient reports main issue of discomfort is intermittent dysuria, which is currently being tx with Cipro.   Hopefully, treating the main source of the pain will alleviate the issues.   Discssed with nurse, no issues of dysphagia.  Cont to monitor  Discussed with hospice - currently not inpatient appropriate.   SW working out plan of care hereafter    ADDENDUM:  Family meeting - See [facility] note.  In summary   1.  Goal is to address his pain so he can be comfortable.  Patient now states he has pain all over.  2.  Discussed pain plan- Added Fentanyl 25mcg patch,  Gabapentin 100 qhs. Oxycodone 10mg solution PRN  3.  Discussed care plan hereafter - HENRIETTA on comfort.  Family made aware that things can change at any time, and then may be appropriate for a hospice inpatient setting   Emotional support

## 2022-03-17 NOTE — PROGRESS NOTE ADULT - CONVERSATION DETAILS
Family had concern about patient's pain and pain control and regimen   explained to them by me and palliative care attending the possible causes of pain and current treatment he is on   Daughter states that his pain is all over back , neck legs not just at cesar site or with urination   The wishes pt clearly stated that he wants to be comfortable and his daughter can make decision and speak on his behalf , family reported episodes of pt being confused delirous not recognizing his wife and him in the picture , talking about van and people last night     plan to add fentaly patch , cont prn pain medication change to liquid or iv if needed since they are concern that he is not eating and not able to swallow pills     all questions answered   will cont cipro for possible UTI , tylenol ATC   morphine prn , fentanly patch also  add neurontin for possible neuropathic pain

## 2022-03-18 VITALS
HEART RATE: 73 BPM | OXYGEN SATURATION: 92 % | RESPIRATION RATE: 18 BRPM | TEMPERATURE: 98 F | SYSTOLIC BLOOD PRESSURE: 164 MMHG | DIASTOLIC BLOOD PRESSURE: 74 MMHG

## 2022-03-18 PROCEDURE — 99497 ADVNCD CARE PLAN 30 MIN: CPT | Mod: 25

## 2022-03-18 PROCEDURE — 99232 SBSQ HOSP IP/OBS MODERATE 35: CPT

## 2022-03-18 PROCEDURE — 99233 SBSQ HOSP IP/OBS HIGH 50: CPT

## 2022-03-18 PROCEDURE — 99498 ADVNCD CARE PLAN ADDL 30 MIN: CPT | Mod: 25

## 2022-03-18 RX ORDER — ROBINUL 0.2 MG/ML
0.2 INJECTION INTRAMUSCULAR; INTRAVENOUS EVERY 6 HOURS
Refills: 0 | Status: DISCONTINUED | OUTPATIENT
Start: 2022-03-18 | End: 2022-03-19

## 2022-03-18 RX ORDER — HYDROMORPHONE HYDROCHLORIDE 2 MG/ML
0.5 INJECTION INTRAMUSCULAR; INTRAVENOUS; SUBCUTANEOUS EVERY 4 HOURS
Refills: 0 | Status: DISCONTINUED | OUTPATIENT
Start: 2022-03-18 | End: 2022-03-19

## 2022-03-18 RX ORDER — SCOPALAMINE 1 MG/3D
1 PATCH, EXTENDED RELEASE TRANSDERMAL ONCE
Refills: 0 | Status: COMPLETED | OUTPATIENT
Start: 2022-03-18 | End: 2022-03-18

## 2022-03-18 RX ORDER — FENTANYL CITRATE 50 UG/ML
1 INJECTION INTRAVENOUS
Refills: 0 | Status: DISCONTINUED | OUTPATIENT
Start: 2022-03-18 | End: 2022-03-18

## 2022-03-18 RX ORDER — OXYCODONE HYDROCHLORIDE 5 MG/1
5 TABLET ORAL EVERY 4 HOURS
Refills: 0 | Status: DISCONTINUED | OUTPATIENT
Start: 2022-03-18 | End: 2022-03-18

## 2022-03-18 RX ORDER — HYDROMORPHONE HYDROCHLORIDE 2 MG/ML
1 INJECTION INTRAMUSCULAR; INTRAVENOUS; SUBCUTANEOUS
Refills: 0 | Status: DISCONTINUED | OUTPATIENT
Start: 2022-03-18 | End: 2022-03-19

## 2022-03-18 RX ADMIN — HYDROMORPHONE HYDROCHLORIDE 1 MILLIGRAM(S): 2 INJECTION INTRAMUSCULAR; INTRAVENOUS; SUBCUTANEOUS at 17:49

## 2022-03-18 RX ADMIN — NYSTATIN CREAM 1 APPLICATION(S): 100000 CREAM TOPICAL at 05:56

## 2022-03-18 RX ADMIN — Medication 650 MILLIGRAM(S): at 06:11

## 2022-03-18 RX ADMIN — OXYCODONE HYDROCHLORIDE 5 MILLIGRAM(S): 5 TABLET ORAL at 06:54

## 2022-03-18 RX ADMIN — FENTANYL CITRATE 1 PATCH: 50 INJECTION INTRAVENOUS at 07:24

## 2022-03-18 RX ADMIN — HYDROMORPHONE HYDROCHLORIDE 0.5 MILLIGRAM(S): 2 INJECTION INTRAMUSCULAR; INTRAVENOUS; SUBCUTANEOUS at 23:16

## 2022-03-18 RX ADMIN — Medication 1 MILLIGRAM(S): at 16:49

## 2022-03-18 RX ADMIN — HYDROMORPHONE HYDROCHLORIDE 0.5 MILLIGRAM(S): 2 INJECTION INTRAMUSCULAR; INTRAVENOUS; SUBCUTANEOUS at 13:16

## 2022-03-18 RX ADMIN — Medication 1 MILLIGRAM(S): at 11:50

## 2022-03-18 RX ADMIN — SCOPALAMINE 1 PATCH: 1 PATCH, EXTENDED RELEASE TRANSDERMAL at 16:51

## 2022-03-18 RX ADMIN — SCOPALAMINE 1 PATCH: 1 PATCH, EXTENDED RELEASE TRANSDERMAL at 19:25

## 2022-03-18 RX ADMIN — Medication 100 MILLIGRAM(S): at 05:56

## 2022-03-18 RX ADMIN — HYDROMORPHONE HYDROCHLORIDE 1 MILLIGRAM(S): 2 INJECTION INTRAMUSCULAR; INTRAVENOUS; SUBCUTANEOUS at 17:21

## 2022-03-18 RX ADMIN — OXYCODONE HYDROCHLORIDE 5 MILLIGRAM(S): 5 TABLET ORAL at 05:56

## 2022-03-18 RX ADMIN — Medication 250 MILLIGRAM(S): at 02:49

## 2022-03-18 RX ADMIN — HYDROMORPHONE HYDROCHLORIDE 0.5 MILLIGRAM(S): 2 INJECTION INTRAMUSCULAR; INTRAVENOUS; SUBCUTANEOUS at 22:16

## 2022-03-18 RX ADMIN — FENTANYL CITRATE 1 PATCH: 50 INJECTION INTRAVENOUS at 09:52

## 2022-03-18 RX ADMIN — Medication 650 MILLIGRAM(S): at 05:56

## 2022-03-18 RX ADMIN — NYSTATIN CREAM 1 APPLICATION(S): 100000 CREAM TOPICAL at 16:33

## 2022-03-18 RX ADMIN — ROBINUL 0.2 MILLIGRAM(S): 0.2 INJECTION INTRAMUSCULAR; INTRAVENOUS at 16:52

## 2022-03-18 RX ADMIN — HYDROMORPHONE HYDROCHLORIDE 0.5 MILLIGRAM(S): 2 INJECTION INTRAMUSCULAR; INTRAVENOUS; SUBCUTANEOUS at 14:17

## 2022-03-18 NOTE — PROGRESS NOTE ADULT - TIME BILLING
Review of chart documents, labs, imaging. Direct patient assessment,  formulation of care plan. Discussion with  Interdisciplinary  team  Dr. Mayorga, family, nurse including ACP 55 _____minutes Review of chart documents, labs, imaging. Direct patient assessment,  formulation of care plan. Discussion with  Interdisciplinary  team  Dr. Mayorga, family, nurse SW, Rafat - Jayde RN, Dr. Louis   including ACP 55 _____minutes

## 2022-03-18 NOTE — PROGRESS NOTE ADULT - ASSESSMENT
81 year old male with history of Lymphedema, Morbid Obesity, COPD on 4LNC, Smoking (stopped 3 years ago), Lung Nodules, HTN, Hypothyroidism, RA and COVID-19 brought by daughter with hypoxia and scrotal swelling. In ER, he was found to be hypoxic to 88%, supplemental oxygen was increased to 5LNC. He was noted to have anasarca and was given a dose of Lasix 40 mg x 1      Assesment / Plan     1-Acute on chronic HFpEF (EF 50-55%) with anasarca   2-Moderate Pulmonary Hypertension,   3-Acute on  Chronic hypercapnic respiratory failure due to  COPD and CHF, Obesity  , pulmonary HTN   4--R pleural effusion   5- Left R  Lesion (suspected RCC)   6-Renal Insufficiency possible CKD type II, stable  7-BPH  8-HTN  9-DM 2 with neuropathy  10-Hypothyroidism   11-Rheumatoid Arthritis    overall prognosis poor   pt is on comfort care   condition declining , unresponsive   palliative care input appreciated , now on iv pain meds , add robinol for secretion   end of life   hospice recalled by palliative care   accepted to hospice in , no bed avaliable     Covid PCR ordered     discharge to hospice once bed avaliable       -

## 2022-03-18 NOTE — PROGRESS NOTE ADULT - CONVERSATION DETAILS
Informed family patient more lethargic this am compared to previous days and concern Fentanyl dose may be a little too much for him and consideration for reduction with continued close monitoring and PRN use.  Fentanyl patch d/c  Family feels patient is comfortable and does want him to be in pain.  We agreed to reduce dose and titrate as needed. Informed family patient more lethargic this am compared to previous days and concern Fentanyl dose may be a little too much  and consideration for reduction with continued close monitoring and PRN use.  Fentanyl patch d/c  Family feels patient is comfortable and does want him to be in pain.  We agreed to reduce dose and titrate as needed.    Patient with agitation -   Informed family appears patient approaching EOL.    Changed medications to IV to help reduce burden of swallowing  Discussed plan for hospice inpatient transfer Informed family patient more lethargic this am compared to previous days and concern Fentanyl dose may be a little too much  and consideration for reduction with continued close monitoring and PRN use.  Fentanyl patch d/c  Family feels patient is comfortable and does want him to be in pain.  We agreed to reduce dose and titrate as needed.    Patient with agitation -   Informed family appears patient approaching EOL.    Discussed with family -  daughter stated she was upset that Fentanyl patch was initially d/c.  She wants her father pain free, does not want him to suffer.  Informed her of plan of care given patient more lethargic. Changed medications to IV to help reduce burden of swallowing.  Discussed plan for hospice inpatient transfer  Family in agreement

## 2022-03-18 NOTE — PROGRESS NOTE ADULT - SUBJECTIVE AND OBJECTIVE BOX
follow up for fluid overload , CHF , pulmonary hypertension     Pt is seen in am around 8 am , unresponsive   discussed with palliative attending , revisited him around 9 : 30 , lethargic , no responce to voice or sternal rub   called daughter to discuss can not reach her ,   later pt's son in law met by me and Dr Forrester   he called pt's daughter Brandie , HCP   explained to her in details concern re pt 's current mental status , strated fentanyl patch which was removed around 10 am   she is cncern that her dad will be in pain uncomfortable and insist on keeping fentanly patch   dose decreased to 12 mcg she agreed , titrate up as needed            Vital Signs Last 24 Hrs  T(C): --  T(F): --  HR: --  BP: --  BP(mean): --  RR: --  SpO2: --    PHYSICAL EXAM:  General: lethargic , no distress looks comfortable  Respiratory: CTA anterior;y   Cardiovascular: Regular rate and rhythm. S1 and S2   Gastrointestinal: Soft , BS +   Extremities: bilateral  venous stasis changes , + pitting edema to the level of the knee bilaterally    : Shukla in place     no labs on comfort care

## 2022-03-18 NOTE — PROGRESS NOTE ADULT - SUBJECTIVE AND OBJECTIVE BOX
CC:  Follow up GOC , Symptoms    OVERNIGHT EVENTS:  patient more lethargic this am  Informed by  Dr. Mayorga- did sternal rub not responding    Present Symptoms:   Dyspnea:  No     Nausea/Vomiting:  No    Anxiety:  Yes    Depression:   Unable  Fatigue:  Yes    Loss of appetite:  Yes     Constipation: Not Reported     Pain:  No Signs            Character-            Duration-            Location-            Severity-    Pain AD Score:  [email or web address]/cog/[email or web address] (press ctrl + left click to view)    Review of Systems: Reviewed as above  Further ROS unable to  obtain due to poor mentation       MEDICATIONS  (STANDING):  ciprofloxacin     Tablet 250 milliGRAM(s) Oral two times a day  HYDROmorphone  Injectable 0.5 milliGRAM(s) IV Push every 4 hours  nystatin Powder 1 Application(s) Topical two times a day  senna 2 Tablet(s) Oral at bedtime    MEDICATIONS  (PRN):  albuterol/ipratropium for Nebulization 3 milliLiter(s) Nebulizer every 6 hours PRN Shortness of Breath and/or Wheezing  glycopyrrolate Injectable 0.2 milliGRAM(s) IV Push every 6 hours PRN secetions  HYDROmorphone  Injectable 1 milliGRAM(s) IV Push every 2 hours PRN Pain and or Dyspnea  LORazepam   Injectable 1 milliGRAM(s) IV Push every 4 hours PRN Agitation      PHYSICAL EXAM:    Vital Signs Last 24 Hrs  T(C): 36.7 (18 Mar 2022 04:24), Max: 36.7 (18 Mar 2022 04:24)  T(F): 98.1 (18 Mar 2022 04:24), Max: 98.1 (18 Mar 2022 04:24)  HR: 73 (18 Mar 2022 04:24) (73 - 73)  BP: 164/74 (18 Mar 2022 04:24) (164/74 - 164/74)  BP(mean): --  RR: 18 (18 Mar 2022 04:24) (18 - 18)  SpO2: 92% (18 Mar 2022 04:24) (92% - 92%)    Karnofsky: 20  %  General:   M lethargic      HEENT:  NCAT               Lungs: comfortable  non labored  CV:  RR  GI:   RR  :  + cesar           MSK: bedbound  Skin:  warm/dry  Neuro  Lethargic    LABS:      I&O's Summary    17 Mar 2022 07:01  -  18 Mar 2022 07:00  --------------------------------------------------------  IN: 0 mL / OUT: 750 mL / NET: -750 mL        RADIOLOGY & ADDITIONAL STUDIES:      ADVANCE DIRECTIVES/TREATMENT PREFERENCES:  comfort care

## 2022-03-18 NOTE — PROGRESS NOTE ADULT - ASSESSMENT
81yr man bedbound ,hx COPD 4-5L home O2, morbid obesity,  HTN, RA, admitted with acute on chronic respiratory failure in the setting of  HFpEF, COPD, pleural effusion    Acute on Chronic Respiratory Failure  Cont O2 support    HFpEF  Patient affirms he does not want to take any further cardiac meds  monitor for symptoms of dyspnea    COPD  Reported on 4-5L NC at home  Albuterol nebulizer PRN    Dysuria  Cont Cipro and Pyridium if patient able to take  monitor    Chronic Pain  Reported pain all over.    Plan was to reduce Fentanyl 25mcg patch.   Patient having more agitation - likely now progressing to EOL  Changed to Dilaudid 0.5mg IVP q4hr and 1mg q2hr PRN    Agitation  Ativan 1mg IVP q4hr PRN    Debility   Assist with care  Lives at home with daughter and son in law and reports needs assistance in his care    Encounter for Palliative Care  see GOC above.   Initially thought Fentanyl dose was little more than needed. However, patient more agitated, and appears he is progressing towards EOL.   Medications changed to IV ATC as above  Will request hospice inpatient transfer   81yr man bedbound ,hx COPD 4-5L home O2, morbid obesity,  HTN, RA, admitted with acute on chronic respiratory failure in the setting of  HFpEF, COPD, pleural effusion    Acute on Chronic Respiratory Failure  Cont O2 support    HFpEF  Patient affirms he does not want to take any further cardiac meds  monitor for symptoms of dyspnea    COPD  Reported on 4-5L NC at home  Albuterol nebulizer PRN    Dysuria  Cont Cipro and Pyridium if patient able to take  monitor    Chronic Pain  Reported pain all over.    Plan  was to reduce Fentanyl 25mcg patch given patient more lethargic.    Patient having more agitation - likely now progressing to EOL  Changed to Dilaudid 0.5mg IVP q4hr and 1mg q2hr PRN    Terminal Agitation  Ativan 1mg IVP q4hr PRN  Doubt it is related to d/c of Fentanyl patch as patch removed less than 90min prior to agitation.     Debility - Functional Quadriplegia  Assist with care      Encounter for Palliative Care  see GOC above.   Initially thought Fentanyl dose was little more than needed. However, patient more agitated, and appears he is progressing towards EOL.   Medications changed to IV ATC as above  Will request hospice inpatient transfer    ADDENDUM:  Followed up on patient.  Family at bedside  Patient resting comfortably - no signs of discomfort  Informed accepted to hospice inpatient.  Emotional support.

## 2022-03-19 ENCOUNTER — TRANSCRIPTION ENCOUNTER (OUTPATIENT)
Age: 82
End: 2022-03-19

## 2022-03-19 LAB
SARS-COV-2 RNA SPEC QL NAA+PROBE: SIGNIFICANT CHANGE UP

## 2022-03-19 PROCEDURE — 76870 US EXAM SCROTUM: CPT

## 2022-03-19 PROCEDURE — 99285 EMERGENCY DEPT VISIT HI MDM: CPT

## 2022-03-19 PROCEDURE — 94760 N-INVAS EAR/PLS OXIMETRY 1: CPT

## 2022-03-19 PROCEDURE — U0005: CPT

## 2022-03-19 PROCEDURE — 94640 AIRWAY INHALATION TREATMENT: CPT

## 2022-03-19 PROCEDURE — 82330 ASSAY OF CALCIUM: CPT

## 2022-03-19 PROCEDURE — 94660 CPAP INITIATION&MGMT: CPT

## 2022-03-19 PROCEDURE — 96374 THER/PROPH/DIAG INJ IV PUSH: CPT

## 2022-03-19 PROCEDURE — 81001 URINALYSIS AUTO W/SCOPE: CPT

## 2022-03-19 PROCEDURE — 84295 ASSAY OF SERUM SODIUM: CPT

## 2022-03-19 PROCEDURE — 85018 HEMOGLOBIN: CPT

## 2022-03-19 PROCEDURE — 80048 BASIC METABOLIC PNL TOTAL CA: CPT

## 2022-03-19 PROCEDURE — 83880 ASSAY OF NATRIURETIC PEPTIDE: CPT

## 2022-03-19 PROCEDURE — 99239 HOSP IP/OBS DSCHRG MGMT >30: CPT

## 2022-03-19 PROCEDURE — 93970 EXTREMITY STUDY: CPT

## 2022-03-19 PROCEDURE — 74177 CT ABD & PELVIS W/CONTRAST: CPT | Mod: ME

## 2022-03-19 PROCEDURE — 82803 BLOOD GASES ANY COMBINATION: CPT

## 2022-03-19 PROCEDURE — 80053 COMPREHEN METABOLIC PANEL: CPT

## 2022-03-19 PROCEDURE — 82435 ASSAY OF BLOOD CHLORIDE: CPT

## 2022-03-19 PROCEDURE — 83735 ASSAY OF MAGNESIUM: CPT

## 2022-03-19 PROCEDURE — 93306 TTE W/DOPPLER COMPLETE: CPT

## 2022-03-19 PROCEDURE — U0003: CPT

## 2022-03-19 PROCEDURE — 85610 PROTHROMBIN TIME: CPT

## 2022-03-19 PROCEDURE — 71045 X-RAY EXAM CHEST 1 VIEW: CPT

## 2022-03-19 PROCEDURE — 87637 SARSCOV2&INF A&B&RSV AMP PRB: CPT

## 2022-03-19 PROCEDURE — 82962 GLUCOSE BLOOD TEST: CPT

## 2022-03-19 PROCEDURE — 84132 ASSAY OF SERUM POTASSIUM: CPT

## 2022-03-19 PROCEDURE — 85014 HEMATOCRIT: CPT

## 2022-03-19 PROCEDURE — 85025 COMPLETE CBC W/AUTO DIFF WBC: CPT

## 2022-03-19 PROCEDURE — G1004: CPT

## 2022-03-19 PROCEDURE — 85730 THROMBOPLASTIN TIME PARTIAL: CPT

## 2022-03-19 PROCEDURE — 99233 SBSQ HOSP IP/OBS HIGH 50: CPT

## 2022-03-19 PROCEDURE — 97163 PT EVAL HIGH COMPLEX 45 MIN: CPT

## 2022-03-19 PROCEDURE — 84100 ASSAY OF PHOSPHORUS: CPT

## 2022-03-19 PROCEDURE — 83036 HEMOGLOBIN GLYCOSYLATED A1C: CPT

## 2022-03-19 PROCEDURE — 84484 ASSAY OF TROPONIN QUANT: CPT

## 2022-03-19 PROCEDURE — 82550 ASSAY OF CK (CPK): CPT

## 2022-03-19 PROCEDURE — 85027 COMPLETE CBC AUTOMATED: CPT

## 2022-03-19 PROCEDURE — 83605 ASSAY OF LACTIC ACID: CPT

## 2022-03-19 PROCEDURE — 82947 ASSAY GLUCOSE BLOOD QUANT: CPT

## 2022-03-19 PROCEDURE — 36415 COLL VENOUS BLD VENIPUNCTURE: CPT

## 2022-03-19 RX ORDER — ROBINUL 0.2 MG/ML
0.4 INJECTION INTRAMUSCULAR; INTRAVENOUS EVERY 6 HOURS
Refills: 0 | Status: DISCONTINUED | OUTPATIENT
Start: 2022-03-19 | End: 2022-03-19

## 2022-03-19 RX ORDER — HYDROMORPHONE HYDROCHLORIDE 2 MG/ML
2 INJECTION INTRAMUSCULAR; INTRAVENOUS; SUBCUTANEOUS
Refills: 0 | Status: DISCONTINUED | OUTPATIENT
Start: 2022-03-19 | End: 2022-03-19

## 2022-03-19 RX ORDER — HYDROMORPHONE HYDROCHLORIDE 2 MG/ML
4 INJECTION INTRAMUSCULAR; INTRAVENOUS; SUBCUTANEOUS
Qty: 0 | Refills: 0 | DISCHARGE
Start: 2022-03-19

## 2022-03-19 RX ORDER — HYDROMORPHONE HYDROCHLORIDE 2 MG/ML
1 INJECTION INTRAMUSCULAR; INTRAVENOUS; SUBCUTANEOUS EVERY 4 HOURS
Refills: 0 | Status: DISCONTINUED | OUTPATIENT
Start: 2022-03-19 | End: 2022-03-19

## 2022-03-19 RX ORDER — ROBINUL 0.2 MG/ML
0.4 INJECTION INTRAMUSCULAR; INTRAVENOUS
Qty: 0 | Refills: 0 | DISCHARGE
Start: 2022-03-19

## 2022-03-19 RX ORDER — IPRATROPIUM/ALBUTEROL SULFATE 18-103MCG
3 AEROSOL WITH ADAPTER (GRAM) INHALATION
Qty: 0 | Refills: 0 | DISCHARGE
Start: 2022-03-19

## 2022-03-19 RX ADMIN — HYDROMORPHONE HYDROCHLORIDE 0.5 MILLIGRAM(S): 2 INJECTION INTRAMUSCULAR; INTRAVENOUS; SUBCUTANEOUS at 10:22

## 2022-03-19 RX ADMIN — HYDROMORPHONE HYDROCHLORIDE 1 MILLIGRAM(S): 2 INJECTION INTRAMUSCULAR; INTRAVENOUS; SUBCUTANEOUS at 17:37

## 2022-03-19 RX ADMIN — HYDROMORPHONE HYDROCHLORIDE 0.5 MILLIGRAM(S): 2 INJECTION INTRAMUSCULAR; INTRAVENOUS; SUBCUTANEOUS at 06:49

## 2022-03-19 RX ADMIN — NYSTATIN CREAM 1 APPLICATION(S): 100000 CREAM TOPICAL at 17:36

## 2022-03-19 RX ADMIN — HYDROMORPHONE HYDROCHLORIDE 0.5 MILLIGRAM(S): 2 INJECTION INTRAMUSCULAR; INTRAVENOUS; SUBCUTANEOUS at 09:52

## 2022-03-19 RX ADMIN — HYDROMORPHONE HYDROCHLORIDE 0.5 MILLIGRAM(S): 2 INJECTION INTRAMUSCULAR; INTRAVENOUS; SUBCUTANEOUS at 02:27

## 2022-03-19 RX ADMIN — Medication 1 MILLIGRAM(S): at 10:36

## 2022-03-19 RX ADMIN — FENTANYL CITRATE 1 PATCH: 50 INJECTION INTRAVENOUS at 00:08

## 2022-03-19 RX ADMIN — HYDROMORPHONE HYDROCHLORIDE 0.5 MILLIGRAM(S): 2 INJECTION INTRAMUSCULAR; INTRAVENOUS; SUBCUTANEOUS at 03:27

## 2022-03-19 RX ADMIN — HYDROMORPHONE HYDROCHLORIDE 1 MILLIGRAM(S): 2 INJECTION INTRAMUSCULAR; INTRAVENOUS; SUBCUTANEOUS at 12:35

## 2022-03-19 RX ADMIN — HYDROMORPHONE HYDROCHLORIDE 2 MILLIGRAM(S): 2 INJECTION INTRAMUSCULAR; INTRAVENOUS; SUBCUTANEOUS at 15:50

## 2022-03-19 RX ADMIN — HYDROMORPHONE HYDROCHLORIDE 0.5 MILLIGRAM(S): 2 INJECTION INTRAMUSCULAR; INTRAVENOUS; SUBCUTANEOUS at 05:49

## 2022-03-19 RX ADMIN — HYDROMORPHONE HYDROCHLORIDE 0.5 MILLIGRAM(S): 2 INJECTION INTRAMUSCULAR; INTRAVENOUS; SUBCUTANEOUS at 13:48

## 2022-03-19 RX ADMIN — Medication 1 MILLIGRAM(S): at 16:12

## 2022-03-19 RX ADMIN — HYDROMORPHONE HYDROCHLORIDE 1 MILLIGRAM(S): 2 INJECTION INTRAMUSCULAR; INTRAVENOUS; SUBCUTANEOUS at 12:05

## 2022-03-19 RX ADMIN — ROBINUL 0.4 MILLIGRAM(S): 0.2 INJECTION INTRAMUSCULAR; INTRAVENOUS at 17:37

## 2022-03-19 RX ADMIN — HYDROMORPHONE HYDROCHLORIDE 2 MILLIGRAM(S): 2 INJECTION INTRAMUSCULAR; INTRAVENOUS; SUBCUTANEOUS at 15:20

## 2022-03-19 RX ADMIN — HYDROMORPHONE HYDROCHLORIDE 0.5 MILLIGRAM(S): 2 INJECTION INTRAMUSCULAR; INTRAVENOUS; SUBCUTANEOUS at 13:18

## 2022-03-19 RX ADMIN — NYSTATIN CREAM 1 APPLICATION(S): 100000 CREAM TOPICAL at 09:53

## 2022-03-19 NOTE — CHART NOTE - NSCHARTNOTEFT_GEN_A_CORE
Source: Patient [ ]  Family [ ]   other [ x]    Current Diet: Diet, Regular (03-13-22 @ 11:36)    Current Weight:   (3/16) 320.7 lbs  (3/13) 338.3 lbs  (3/12) 335.2 lbs  (3/10) 359.7 lbs  (3/9) 350 lbs  (3/8) 352.5 lbs     Pertinent Medications: MEDICATIONS  (STANDING):  HYDROmorphone  Injectable 0.5 milliGRAM(s) IV Push every 4 hours  nystatin Powder 1 Application(s) Topical two times a day    MEDICATIONS  (PRN):  albuterol/ipratropium for Nebulization 3 milliLiter(s) Nebulizer every 6 hours PRN Shortness of Breath and/or Wheezing  glycopyrrolate Injectable 0.2 milliGRAM(s) IV Push every 6 hours PRN secetions  HYDROmorphone  Injectable 1 milliGRAM(s) IV Push every 2 hours PRN Pain and or Dyspnea  LORazepam   Injectable 1 milliGRAM(s) IV Push every 4 hours PRN Agitation    Pertinent Labs: N/A    Nutrition focused physical exam not conducted - found signs of malnutrition [ ]absent [ ]present    Subcutaneous fat loss: [ ] Orbital fat pads region, [ ]Buccal fat region, [ ]Triceps region,  [ ]Ribs region    Muscle wasting: [ ]Temples region, [ ]Clavicle region, [ ]Shoulder region, [ ]Scapula region, [ ]Interosseous region,  [ ]thigh region, [ ]Calf region    Estimated Needs:   [x ] no change since previous assessment  [ ] recalculated:     Current Nutrition Diagnosis: Pt remains at nutrition risk secondary to unintended weight gain related to acute on chronic HFpEF, moderate pulmonary hypertension, right pleural effusion, CHF as evidenced by 71 lb unintentional weight gain over past few weeks. Pt unresponsive, aware accepted in Hospice. RD remains available as needed.    Recommendations:   1) Honor pt/pt family wishes regarding nutrition/hydration.     Monitoring and Evaluation:   [ ] PO intake [ ] Tolerance to diet prescription [ ] Weights  [X] Follow up per protocol [ ] Labs

## 2022-03-19 NOTE — DISCHARGE NOTE PROVIDER - NSDCCPCAREPLAN_GEN_ALL_CORE_FT
PRINCIPAL DISCHARGE DIAGNOSIS  Diagnosis: CHF exacerbation  Assessment and Plan of Treatment: acute on chronic diastolic heart failure; patient is now for hospice inn transfer.

## 2022-03-19 NOTE — PROGRESS NOTE ADULT - REASON FOR ADMISSION
Scrotal swelling

## 2022-03-19 NOTE — PROGRESS NOTE ADULT - ASSESSMENT
81yr man bedbound ,hx COPD 4-5L home O2, morbid obesity,  HTN, RA, admitted with acute on chronic respiratory failure in the setting of  HFpEF, COPD, pleural effusion    ·	increased dose of scheduled Dilaudid to 1mg IV q4h ATC  ·	increased dose of rescue Dilaudid to 2mg IV q2h PRN for Pain/Dyspnea  ·	ordered scheduled Robinul 0.4mg IV q6h ATC    Changes made based on 24hr PRN and discussed with family, in agreement with the plan. 81yr man bedbound ,hx COPD 4-5L home O2, morbid obesity,  HTN, RA, admitted with acute on chronic respiratory failure in the setting of  HFpEF, COPD, pleural effusion    ·	increased dose of scheduled Dilaudid to 1mg IV q4h ATC  ·	increased dose of rescue Dilaudid to 2mg IV q2h PRN for Pain/Dyspnea  ·	ordered scheduled Robinul 0.4mg IV q6h ATC    Changes made based on 24hr PRN and discussed with family, in agreement with the plan.    #Acute on Chronic Respiratory Failure  Cont O2 support, prioritize medications over escalating O2    #HFpEF  Patient affirms he does not want to take any further cardiac meds  monitor for symptoms of dyspnea    #COPD  Reported on 4-5L NC at home  Dyspnea management as noted above    #Chronic Pain  Continue with parenteral opiates for adequate pain control. Changes to pain regimen noted above    #Terminal Agitation  Ativan 1mg IVP q4hr PRN    #Debility - Functional Quadriplegia  Assist with care    #Encounter for Palliative Care  Goal is symptom-directed/comfort care. Discussed plan and medications with family. Ongoing coordination for inpatient hospice transfer

## 2022-03-19 NOTE — PROGRESS NOTE ADULT - SUBJECTIVE AND OBJECTIVE BOX
[facility]  Johan Payne, Palliative Care Attending  Contact Info: message on [employer] Teams (Johan Payne)    FULL NOTE TO FOLLOW  SUBJECTIVE AND OBJECTIVE:  INTERVAL HPI/OVERNIGHT EVENTS: Interval events noted. Patient required PRNs of Dilaudid x3, Ativan x2, and Robinul x1 in past 24hrs. No unexpected adverse effects of opiates noted. Discussed with daughter at bedside    Allergies  sulfamethoxazole (Urticaria)    MEDICATIONS  (STANDING):  glycopyrrolate Injectable 0.4 milliGRAM(s) IV Push every 6 hours  HYDROmorphone  Injectable 1 milliGRAM(s) IV Push every 4 hours  nystatin Powder 1 Application(s) Topical two times a day    MEDICATIONS  (PRN):  albuterol/ipratropium for Nebulization 3 milliLiter(s) Nebulizer every 6 hours PRN Shortness of Breath and/or Wheezing  HYDROmorphone  Injectable 2 milliGRAM(s) IV Push every 2 hours PRN Pain and or Dyspnea  LORazepam   Injectable 1 milliGRAM(s) IV Push every 4 hours PRN Agitation      ITEMS UNCHECKED ARE NOT PRESENT  PRESENT SYMPTOMS: []Unable to obtain due to poor mentation   Source if other than patient:  []Family   []Team     Pain: [ ] yes [ ] no  QOL impact -   Location -                    Aggravating factors -  Quality -  Radiation -  Timing -  Severity (0-10 scale):  Minimal acceptable level (0-10 scale):    PAIN AD Score:    Dyspnea:                           []Mild  []Moderate []Severe  Anxiety:                             []Mild []Moderate []Severe  Fatigue:                             []Mild []Moderate []Severe  Nausea:                             []Mild []Moderate []Severe  Loss of appetite:              []Mild []Moderate []Severe  Constipation:                    []Mild []Moderate []Severe    Other Symptoms:  []All other review of systems negative     Palliative Performance Status Version 2: %    PHYSICAL EXAM:  Vital Signs Last 24 Hrs  T(C): --  T(F): --  HR: --  BP: --  BP(mean): --  RR: --  SpO2: --    GENERAL:  []Alert  []Oriented x   []Lethargic  []Cachexia  []Unarousable  []Verbal  []Non-Verbal  Behavioral:   [] Anxiety  [] Delirium [] Agitation [] Cooperative  HEENT:  []Normal   []Dry mouth   []ET Tube/Trach  []Oral lesions  PULMONARY:   []Clear []Tachypnea  []Audible excessive secretions   []Rhonchi        []Right []Left []Bilateral  []Crackles        []Right []Left []Bilateral  []Wheezing     []Right []Left []Bilateral  CARDIOVASCULAR:    []Regular []Irregular []Tachy  []Binh []Murmur []Other  GASTROINTESTINAL:  []Soft  []Distended   []+BS  []Non tender []Tender  []PEG []OGT/ NGT  Last BM:  GENITOURINARY:  []Normal [] Incontinent   []Oliguria/Anuria   []Shukla  MUSCULOSKELETAL:   []Normal   []Weakness  []Bed/Wheelchair bound []Edema  NEUROLOGIC:   []No focal deficits  [] Cognitive impairment  [] Dysphagia []Dysarthria [] Paresis []Encephalopathic  SKIN:   []Normal   []Pressure ulcer(s)  []Rash    CRITICAL CARE:  [ ] Shock Present  [ ]Septic [ ]Cardiogenic [ ]Neurologic [ ]Hypovolemic  [ ]  Vasopressors [ ]  Inotropes   [ ] Respiratory failure present [ ] Mechanical Ventilation [ ] Non-invasive ventilatory support [ ] High-Flow  [ ] Acute  [ ] Chronic [ ] Hypoxic  [ ] Hypercarbic [ ] Other  [ ] Other organ failure    LABS: None new    RADIOLOGY & ADDITIONAL STUDIES: None New    DISCUSSED CASE WITH: Daughter, Son-in-law [facility]  Johan Payne, Palliative Care Attending  Contact Info: message on [employer] Teams (Johan Payne)    FULL NOTE TO FOLLOW  SUBJECTIVE AND OBJECTIVE:  INTERVAL HPI/OVERNIGHT EVENTS: Interval events noted. Patient required PRNs of Dilaudid x3, Ativan x2, and Robinul x1 in past 24hrs. No unexpected adverse effects of opiates noted. Discussed with daughter at bedside    Allergies  sulfamethoxazole (Urticaria)    MEDICATIONS  (STANDING):  glycopyrrolate Injectable 0.4 milliGRAM(s) IV Push every 6 hours  HYDROmorphone  Injectable 1 milliGRAM(s) IV Push every 4 hours  nystatin Powder 1 Application(s) Topical two times a day    MEDICATIONS  (PRN):  albuterol/ipratropium for Nebulization 3 milliLiter(s) Nebulizer every 6 hours PRN Shortness of Breath and/or Wheezing  HYDROmorphone  Injectable 2 milliGRAM(s) IV Push every 2 hours PRN Pain and or Dyspnea  LORazepam   Injectable 1 milliGRAM(s) IV Push every 4 hours PRN Agitation      ITEMS UNCHECKED ARE NOT PRESENT  PRESENT SYMPTOMS: [x]Unable to obtain due to poor mentation   Source if other than patient:  [x]Family   []Team     Pain: [ ] yes [ ] no  QOL impact -   Location -                    Aggravating factors -  Quality -  Radiation -  Timing -  Severity (0-10 scale):  Minimal acceptable level (0-10 scale):    PAIN AD Score: 2    Dyspnea:                           []Mild  []Moderate []Severe  Anxiety:                             []Mild []Moderate []Severe  Fatigue:                             []Mild []Moderate []Severe  Nausea:                             []Mild []Moderate []Severe  Loss of appetite:              []Mild []Moderate []Severe  Constipation:                    []Mild []Moderate []Severe    Other Symptoms:  []All other review of systems negative     Palliative Performance Status Version 2: 10%    PHYSICAL EXAM:  Vital Signs Last 24 Hrs  T(C): --  T(F): --  HR: --  BP: --  BP(mean): --  RR: --  SpO2: --    GENERAL:  []Alert  []Oriented x   []Lethargic  []Cachexia  [x]Unarousable  []Verbal  [x]Non-Verbal  Behavioral:   [] Anxiety  [x] Delirium [] Agitation [] Cooperative  HEENT:  []Normal   [x]Dry mouth   []ET Tube/Trach  []Oral lesions  PULMONARY:   [x]Clear []Tachypnea  []Audible excessive secretions   []Rhonchi        []Right []Left []Bilateral  []Crackles        []Right []Left []Bilateral  []Wheezing     []Right []Left []Bilateral  CARDIOVASCULAR:    [x]Regular []Irregular []Tachy  []Binh []Murmur []Other  GASTROINTESTINAL:  [x]Soft  [x]Distended   [x]+BS  [x]Non tender []Tender  []PEG []OGT/ NGT  Last BM:  GENITOURINARY:  []Normal [] Incontinent   []Oliguria/Anuria   [x]Shukla  MUSCULOSKELETAL:   []Normal   []Weakness  [x]Bed/Wheelchair bound []Edema  NEUROLOGIC:   []No focal deficits  [] Cognitive impairment  [x] Dysphagia []Dysarthria [] Paresis [x]Encephalopathic  SKIN:   [x]Normal   []Pressure ulcer(s)  []Rash    LABS: None new    RADIOLOGY & ADDITIONAL STUDIES: None New    DISCUSSED CASE WITH: Daughter, Son-in-law

## 2022-03-19 NOTE — PROGRESS NOTE ADULT - PROVIDER SPECIALTY LIST ADULT
Cardiology
Hospitalist
Cardiology
Hospitalist
Palliative Care
Hospitalist
Palliative Care
Palliative Care
Pulmonology
Pulmonology
Hospitalist

## 2022-03-19 NOTE — DISCHARGE NOTE PROVIDER - NSDCFUADDAPPT_GEN_ALL_CORE_FT
[facility]:  A. FOLLOW-UP APPOINTMENT:  1. PULMONOLOGY   Almas Interiano  TUESDAY March 29, 2022 at 11:30am  [address], [place] [address]  Phone: [phone]    2. CARDIOLOGY  TUESDAY MARCH 22, 2022 at 10:15 am  Dr. Vera [address] [place] [address]  Phone: [phone]    3. Yellow Folder given.

## 2022-03-19 NOTE — DISCHARGE NOTE PROVIDER - NSDCMRMEDTOKEN_GEN_ALL_CORE_FT
glycopyrrolate 0.2 mg/mL injectable solution: 0.4 milligram(s) injectable every 6 hours  HYDROmorphone: 4 milligram(s) intravenous every 4 hours, As Needed  ipratropium-albuterol 0.5 mg-2.5 mg/3 mL inhalation solution: 3 milliliter(s) inhaled every 6 hours, As needed, Shortness of Breath and/or Wheezing  LORazepam: 1 milligram(s) intravenous every 4 hours, As Needed

## 2022-03-19 NOTE — DISCHARGE NOTE PROVIDER - HOSPITAL COURSE
81 year old male with history of Lymphedema, Morbid Obesity, COPD on 4LNC, Smoking (stopped 3 years ago), Lung Nodules, HTN, Hypothyroidism, RA and COVID-19 brought by daughter with hypoxia and scrotal swelling. In ER, he was found to be hypoxic to 88%, supplemental oxygen was increased to 5LNC. He was noted to have anasarca and was admitted for acute on chronic diastolic heart failure. PAtient started on IV diuretics. Initially had a poor response to lasix and this was increased to bumex. Patient had poor tolerance of bipap at night. He had many episodes of hypercapneic encephalopathy due to this. In GOC discussion patient elected for comfort measures above all other treatment options due to his chronic pain and poor tolerance of NIV. Patient elected for inpatient hospice and comfort measures while here. He was evaluated for home hospice but not a candidate. NOw requires IV medications for pain control. Will be transferred to the hospice inn today.

## 2022-03-19 NOTE — DISCHARGE NOTE PROVIDER - ATTENDING DISCHARGE PHYSICAL EXAMINATION:
PHYSICAL EXAM:  General: in no acute distress  Eyes: PERRLA, EOMI; conjunctiva and sclera clear  Head: Normocephalic; atraumatic  ENMT: No nasal discharge; airway clear  Neck: Supple; non tender; no masses  Respiratory: No wheezes, rales or rhonchi  Cardiovascular: Regular rate and rhythm. S1 and S2 Normal; No murmurs, gallops or rubs  Gastrointestinal: Soft non-tender non-distended; Normal bowel sounds  Genitourinary: No costovertebral angle tenderness  Extremities: Normal range of motion, No clubbing, cyanosis or edema; significant bilateral edema.  Vascular: Peripheral pulses palpable 2+ bilaterally  Neurological: unresponsive  Skin: Warm and dry. No acute rash

## 2022-03-22 ENCOUNTER — APPOINTMENT (OUTPATIENT)
Dept: CARDIOLOGY | Facility: CLINIC | Age: 82
End: 2022-03-22

## 2022-03-29 ENCOUNTER — APPOINTMENT (OUTPATIENT)
Dept: PULMONOLOGY | Facility: CLINIC | Age: 82
End: 2022-03-29

## 2023-03-10 NOTE — PATIENT PROFILE ADULT - NSPROPTRIGHTBILLOFRIGHTS_GEN_A_NUR
patient
GOAL: Pt will improve gross strength by half a muscle grade in order to improve ADL skills in 4 weeks.

## 2023-11-06 RX ORDER — OXYCODONE AND ACETAMINOPHEN 5; 325 MG/1; MG/1
1 TABLET ORAL
Qty: 0 | Refills: 0 | DISCHARGE

## 2023-11-06 RX ORDER — METOPROLOL TARTRATE 50 MG
1 TABLET ORAL
Qty: 0 | Refills: 0 | DISCHARGE

## 2023-11-06 RX ORDER — METFORMIN HYDROCHLORIDE 850 MG/1
1 TABLET ORAL
Qty: 0 | Refills: 0 | DISCHARGE

## 2023-11-06 RX ORDER — CHOLECALCIFEROL (VITAMIN D3) 125 MCG
1 CAPSULE ORAL
Qty: 0 | Refills: 0 | DISCHARGE

## 2023-11-06 RX ORDER — FLURBIPROFEN 100 MG
1 TABLET ORAL
Qty: 0 | Refills: 0 | DISCHARGE

## 2023-11-06 RX ORDER — FLUTICASONE FUROATE, UMECLIDINIUM BROMIDE AND VILANTEROL TRIFENATATE 200; 62.5; 25 UG/1; UG/1; UG/1
1 POWDER RESPIRATORY (INHALATION)
Qty: 0 | Refills: 0 | DISCHARGE

## 2023-11-06 RX ORDER — METHOTREXATE 2.5 MG/1
0 TABLET ORAL
Qty: 0 | Refills: 0 | DISCHARGE

## 2023-11-06 RX ORDER — AMLODIPINE BESYLATE 2.5 MG/1
1 TABLET ORAL
Qty: 0 | Refills: 0 | DISCHARGE

## 2023-11-06 RX ORDER — LEVOTHYROXINE SODIUM 125 MCG
1 TABLET ORAL
Qty: 0 | Refills: 0 | DISCHARGE

## 2023-11-06 RX ORDER — PREGABALIN 225 MG/1
2500 CAPSULE ORAL
Qty: 0 | Refills: 0 | DISCHARGE

## 2023-11-06 RX ORDER — FUROSEMIDE 40 MG
1 TABLET ORAL
Qty: 0 | Refills: 0 | DISCHARGE

## 2023-11-06 RX ORDER — FOLIC ACID 0.8 MG
1 TABLET ORAL
Qty: 0 | Refills: 0 | DISCHARGE

## 2025-06-21 NOTE — ED PROVIDER NOTE - CADM POA PRESS ULCER
89F c hx GERD, R breast CA (40-50y ago) s/p B/L mastectomy and radiation, RUE lymphedema, osteoarthritis/osteoporosis (on monthly ibandronate), HLD, Afib on eliquis, HOCM/severe LVOT/LORA, small-moderate pericardial effusion/RA diastolic collapse, recent hospitalization 5/29-6/5 for RML PNA, GBS bacteremia (pos cultures 5/29, 5/30) of unclear source on total 10 days abx (IV ceftriaxone inpt, then levaquin 750 q48h until 6/9), presents from [facility] rehab with shaking chills, hypotension, poor appetite  History from pt's daughter/primary decision maker Teresita Day at bedside.  While at [facility], pt found to have hypotension to SBP 80s. Pt was given IVF and reduced dose of metoprolol. Pt also found to have tachycardia, shaking chills. Pt sent back to the hospital. Reportedly pt has not been eating well, not urinating much. Denies CP, SOB, abd pain, diarrhea, cough, other respiratory symptoms. At baseline pt ambulates without assistive device, drives.  RRT called in the ed for hypotension to SBP 79. (08 Jun 2025 02:41)  to me pt daughter says she was very sleepy in the morning too  as she was given ambien at 4 AM in the morning:   now she is alert and awake and is on 2 L of oxygen ;  she is not sob:  and does not look to be in any resp distress:       Severe sepsis.   unclear source of fevers  cont empiric vanc, cefepime  f/u blood cx. if positive will likely need MARIA G  monitor for any localizing symptoms  pt had recent pan ct scan that did not elucidate cause of bacteremia  recent blood cultures have been negative   - IVF  on cefepime  she is febrile too   vbg on admission IS ok;   MONITOR BLOOD PRESSURE CLOSELY:   OIF SHE DROPS HER BLOOD PRESSURE MORE:  WOULD NEED MICU  CO NSULT:    6/9: seems slightly more tored today  ; cont antibtiocs:  per ID:  she remains on 2 L of oxygen : she is alert and awake:  daughter at bedside:  reviewed the labs and echo and ct scan chest with the daughter in detail :   she has large pericardial effusions:  per ir no good window and effusion seemd small to drain : ct scan chest read as mod to large pericardial effusion : defer to cards :     6/10: seems to be doing  ok :  no sob:   no  cough  ;   no  phlegm   on 2 L of oxygen :  thoracic to see:    no emergency in tapping the pleural effusion:   de cardiologist  6/11: on ceftriaxone   seems O K   6/12: seems OK:  s/p pericardial drain:  has 400 cc output   await cytology    6/13: cont c urrent rx:   on antibiotics  6/14: on ceftriaxone    6/15/l seems to be doing  ok ;  no sob;  no cough :   no phlegm      6/17; pulm wise she looks tachypneic today ; rpt limited echo with no change from before;  cxr done today with no significant change from before;   abg done today seems pretty reasonable   will do di mine;  last d dimer on 3rd was slightly high ;  if the d dimer has increased significantly  would do cta;  dw acp : her creat is normal:  she has been off ac for pericardial drain for some time;   on antibiotics   40 mg ov iv solumedrol given     6/18: HER D DIMER IS VERY HIGH :  going for cta;    depending upon the cta:  if she has significant pl effusion , would like to tap, if there is no pe    dw acp     6/19: seems to be doing  ok ;  for thoracentesis ; today  : confirmed with USG   ; off ac for now:  to restart after the procedure     6/20:  ABX as per ID     6/21; resolved  now off antibiotics  no fever:       Pleural Effusion  -S/p R US guided thoracentesis 6/19, 1.25 L drained  -CXR post procedure with mild congestion, residual effusions but overall improved. No clear ptx  -F/u pleural fluid studies  -Keep O>I as tolerated     6/21; s/p tap:  transudative fluids:    needs diuresis:  hard to give with hocm :  rpt cxr in am:  if sheis rapidly filling up with pl effusion,  she may need pleurx:  uzma son and daugter in detail :       Pericardial effusion ;   the ct chest shows increasing pericardial effusion  : which is of more pressing concern then pleural effusion :  needs a tap:  ir guided tap    6/12: as above   6/13: asked up to tap:  need to repeat inr and pt  last was  >  2.0:  needs to be less then 1.5:  ip contacted will evalute for tap    6/14; defer to p r imary team  6/16/: seems OK;'' pericardial effusion removed    6/17: limited echo today with no change   6/18: defer to cards : cyto is still pending   6/19; cyto i n  pl fluid is negative for malignant cells:   6/20: Management per cards   6/21: tapped:  doing  ok         Hypotension.  suspect combination of infection, hocm, lora, tachycardia, pericardial effusion, metoprolol  pt has somewhat tenuous hemodynamic status  consider cardioversion, consider repeat TTE  pt is full code.  pts blood pressure is slightly low   on midodrine  6/9: cont on midodrine  6/10; controlled:  on midodrine  6/11: on midodrine  6/12: currently she is on vasopressors   6/13; off vasopressors now   6/14; blood pressure soft:  on midodrine   6/15: her blood pressure is pretty good:   6/17: blood pressure today is reasonable;   6/18: off midodrine and blood pressure is pretty good:  on metoprolol   6/19/ on metoprolol :   6/20: BP controlled    6*21: controlled:  on meto :  off midodrine now:       Chronic atrial fibrillation.  reduce metoprolol dose to tartrate 25mg BID  goal HR <100 in setting of HOCM with hypotension  consider amiodarone or cardioversion  PER CARDS    6/9: defer to cards   6/10: off Eliquis for now   6/11: remains off eliquis   6/12: off ac  6/13: keep off ac if thoracentesis needed to be done over the weekend:  though it is not emergent   6/14" can restart ac:  if required as there is now no plan for thoracentesis:  dw attending   6/16; off ac as pericardial arnie is removed today   6/17; remains off ac:  restart as recommended by ir note  6/18: still off heparin :  cta awaited do urgent   6/19:  cta done: no pe:  has mod to large effusion on rigth side: for tap today    6/20: AC with heparin drip   6/21: on eliquis now        Type 2 myocardial infarction.  -per cards    HOCM (hypertrophic obstructive cardiomyopathy).  -per cards    Acute respiratory failure with hypoxia.   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  cont BD :   ct chest showed: No pulmonary embolism. Small to moderate right and small left pleural effusions with associated  atelectasis. 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  Cardiomegaly. Moderate pericardial effusion.  needs echo     9/6: new echo reviewed:  seen by cardiology :  monitor her blood pressure closely:  if she drops her blood pressure call CCU     6/10: cont to manain o2 sao2 above 90% all the t [place]  6/11; n 2 L of oxygen    6/12: she is not in any resp distress    6/13: she is on rooma ir:  doing well :  satting at 93%  6/14: pulm wise seems pretty good;   on rooma ir;   no plan for tap now     6/16; she seems OK;  she is on 1 L of oxygen ; would suggest to repeat cxr   6/17; she is on 2 L of oxygen  :  satting at 96%    6/20  Keep sats >90% with O2 PRN  Denies SOB today     uzma acp and daughter   if she deteriorates further ;  call ccu/ MICU     6/21; uzma goode , son and the daughter      No